# Patient Record
Sex: MALE | Race: WHITE | NOT HISPANIC OR LATINO | Employment: FULL TIME | URBAN - METROPOLITAN AREA
[De-identification: names, ages, dates, MRNs, and addresses within clinical notes are randomized per-mention and may not be internally consistent; named-entity substitution may affect disease eponyms.]

---

## 2018-06-08 ENCOUNTER — TELEPHONE (OUTPATIENT)
Dept: FAMILY MEDICINE | Facility: CLINIC | Age: 53
End: 2018-06-08

## 2018-06-08 NOTE — TELEPHONE ENCOUNTER
----- Message from Giovany Del Real sent at 6/8/2018  4:47 PM CDT -----  Doctor appointment and lab have been scheduled.  Please link lab orders to the lab appointment.  Date of doctor appointment:  7/30  Physical or EP:  Physical  Date of lab appointment:  7/23  Comments:

## 2018-09-28 ENCOUNTER — OFFICE VISIT (OUTPATIENT)
Dept: FAMILY MEDICINE | Facility: CLINIC | Age: 53
End: 2018-09-28
Payer: COMMERCIAL

## 2018-09-28 VITALS
TEMPERATURE: 98 F | BODY MASS INDEX: 29.32 KG/M2 | DIASTOLIC BLOOD PRESSURE: 78 MMHG | HEIGHT: 70 IN | WEIGHT: 204.81 LBS | RESPIRATION RATE: 20 BRPM | SYSTOLIC BLOOD PRESSURE: 100 MMHG

## 2018-09-28 DIAGNOSIS — R01.1 HEART MURMUR: ICD-10-CM

## 2018-09-28 DIAGNOSIS — Z12.11 SCREENING FOR COLORECTAL CANCER: ICD-10-CM

## 2018-09-28 DIAGNOSIS — Z86.006 HISTORY OF MELANOMA IN SITU: ICD-10-CM

## 2018-09-28 DIAGNOSIS — Z23 NEED FOR PROPHYLACTIC VACCINATION AND INOCULATION AGAINST INFLUENZA: ICD-10-CM

## 2018-09-28 DIAGNOSIS — Z22.7 LATENT TUBERCULOSIS BY SKIN TEST: ICD-10-CM

## 2018-09-28 DIAGNOSIS — R74.01 ELEVATED TRANSAMINASE LEVEL: ICD-10-CM

## 2018-09-28 DIAGNOSIS — Z00.00 ANNUAL PHYSICAL EXAM: Primary | ICD-10-CM

## 2018-09-28 DIAGNOSIS — Z12.12 SCREENING FOR COLORECTAL CANCER: ICD-10-CM

## 2018-09-28 PROCEDURE — 90686 IIV4 VACC NO PRSV 0.5 ML IM: CPT | Mod: S$GLB,,, | Performed by: INTERNAL MEDICINE

## 2018-09-28 PROCEDURE — 99999 PR PBB SHADOW E&M-EST. PATIENT-LVL III: CPT | Mod: PBBFAC,,, | Performed by: INTERNAL MEDICINE

## 2018-09-28 PROCEDURE — 99386 PREV VISIT NEW AGE 40-64: CPT | Mod: 25,S$GLB,, | Performed by: INTERNAL MEDICINE

## 2018-09-28 PROCEDURE — 90471 IMMUNIZATION ADMIN: CPT | Mod: S$GLB,,, | Performed by: INTERNAL MEDICINE

## 2018-09-28 NOTE — PROGRESS NOTES
Two patient identifiers verified.  Allergies reviewed.Flu vaccine fiven   IM administered to Left deltoid per MD order.  Patient tolerated injection well; no redness, bleeding, or bruising noted to injection site.  Patient instructed to remain in clinic setting for 15 minutes.  Verbalizes understanding.  Flu consent signed by patient. Flu vaccine administered.

## 2018-09-28 NOTE — PROGRESS NOTES
Subjective:        Patient ID: Hector Torres is a 53 y.o. male.    Chief Complaint: Annual Exam    HPI   Hector Torres presents for annual exam and to establish care.    Review of Systems   Constitutional: Negative for activity change and unexpected weight change.   HENT: Negative for ear pain, hearing loss, sore throat and trouble swallowing.    Eyes: Negative for visual disturbance (glasses UTD).   Respiratory: Negative for chest tightness and shortness of breath.    Cardiovascular: Negative for chest pain and leg swelling.        Has been told in the past he has heart murmur   Gastrointestinal: Negative for abdominal pain, blood in stool, constipation and diarrhea.   Genitourinary: Positive for frequency (more than normal in the past). Negative for difficulty urinating, dysuria and hematuria.   Musculoskeletal: Negative for arthralgias and back pain.   Skin: Negative for rash.   Allergic/Immunologic: Negative for immunocompromised state.   Neurological: Negative for dizziness, light-headedness and headaches.   Hematological: Does not bruise/bleed easily.   Psychiatric/Behavioral: Negative for dysphoric mood and sleep disturbance. The patient is not nervous/anxious.            Objective:        Vitals:    09/28/18 1308   BP: 100/78   Resp: 20   Temp: 98.4 °F (36.9 °C)     Physical Exam   Constitutional: He is oriented to person, place, and time. He appears well-developed and well-nourished. No distress.   HENT:   Head: Normocephalic and atraumatic.   Right Ear: External ear normal.   Left Ear: External ear normal.   Nose: Nose normal.   - bilateral ear canals clear, tympanic membranes visualized - normal color and light reflex   Eyes: Conjunctivae and EOM are normal.   Neck: Normal range of motion. Neck supple. No thyromegaly present.   Cardiovascular: Normal rate and regular rhythm.   Murmur (2/6) heard.  Pulmonary/Chest: Effort normal and breath sounds normal. No respiratory distress.   Abdominal: Soft.  He exhibits no distension and no mass. There is no tenderness. There is no guarding.   Musculoskeletal: He exhibits no edema.   Lymphadenopathy:     He has no cervical adenopathy.   Neurological: He is alert and oriented to person, place, and time.   Skin: Skin is warm and dry. No rash noted. No erythema.   Psychiatric: He has a normal mood and affect. His behavior is normal. Judgment and thought content normal.   Vitals reviewed.      Pt had labs done 1 month ago for life insurance.  Normal lipids (HDL >50), BUN/cr, PSA, HIV neg, glucose.  AST 50s, nl ALT.    Assessment:         1. Annual physical exam    2. Need for prophylactic vaccination and inoculation against influenza    3. Screening for colorectal cancer    4. History of melanoma in situ    5. Latent tuberculosis by skin test    6. Heart murmur    7. Elevated transaminase level              Plan:         Hector was seen today for annual exam.    Diagnoses and all orders for this visit:    Annual physical exam  - flu shot today  - c-scope ordered; pt had first c-scope 3 years ago, polyps removed and repeat recommended in 3 years  - reviewed prostate cancer screening guidelines    Need for prophylactic vaccination and inoculation against influenza  -     Flu Vaccine - Quadrivalent (PF) (3 years & older)    Screening for colorectal cancer  -     Case request GI: COLONOSCOPY    History of melanoma in situ: Sees derm annually for skin exam.    Latent tuberculosis by skin test: Treated.    Heart murmur: Asymptomatic.  If worse or becomes symptomatic, will get echo.    Elevated transaminase level: Repeat liver enzymes.  -     Hepatic function panel; Future            Follow-up in about 1 year (around 9/28/2019) for annual exam or sooner as needed.

## 2018-10-04 ENCOUNTER — PATIENT MESSAGE (OUTPATIENT)
Dept: FAMILY MEDICINE | Facility: CLINIC | Age: 53
End: 2018-10-04

## 2018-10-04 ENCOUNTER — LAB VISIT (OUTPATIENT)
Dept: LAB | Facility: HOSPITAL | Age: 53
End: 2018-10-04
Attending: INTERNAL MEDICINE
Payer: COMMERCIAL

## 2018-10-04 DIAGNOSIS — R74.01 ELEVATED TRANSAMINASE LEVEL: ICD-10-CM

## 2018-10-04 DIAGNOSIS — Z12.11 SPECIAL SCREENING FOR MALIGNANT NEOPLASMS, COLON: Primary | ICD-10-CM

## 2018-10-04 LAB
ALBUMIN SERPL BCP-MCNC: 4.2 G/DL
ALP SERPL-CCNC: 41 U/L
ALT SERPL W/O P-5'-P-CCNC: 32 U/L
AST SERPL-CCNC: 46 U/L
BILIRUB DIRECT SERPL-MCNC: 0.3 MG/DL
BILIRUB SERPL-MCNC: 0.8 MG/DL
PROT SERPL-MCNC: 7 G/DL

## 2018-10-04 PROCEDURE — 36415 COLL VENOUS BLD VENIPUNCTURE: CPT | Mod: PO

## 2018-10-04 PROCEDURE — 80076 HEPATIC FUNCTION PANEL: CPT

## 2018-10-04 RX ORDER — POLYETHYLENE GLYCOL 3350, SODIUM SULFATE ANHYDROUS, SODIUM BICARBONATE, SODIUM CHLORIDE, POTASSIUM CHLORIDE 236; 22.74; 6.74; 5.86; 2.97 G/4L; G/4L; G/4L; G/4L; G/4L
4 POWDER, FOR SOLUTION ORAL ONCE
Qty: 4000 ML | Refills: 0 | Status: SHIPPED | OUTPATIENT
Start: 2018-10-04 | End: 2018-10-04

## 2018-11-02 ENCOUNTER — HOSPITAL ENCOUNTER (OUTPATIENT)
Facility: HOSPITAL | Age: 53
Discharge: HOME | End: 2018-11-02
Attending: COLON & RECTAL SURGERY | Admitting: COLON & RECTAL SURGERY
Payer: COMMERCIAL

## 2018-11-02 ENCOUNTER — ANESTHESIA EVENT (OUTPATIENT)
Dept: ENDOSCOPY | Facility: HOSPITAL | Age: 53
End: 2018-11-02
Payer: COMMERCIAL

## 2018-11-02 ENCOUNTER — ANESTHESIA (OUTPATIENT)
Dept: ENDOSCOPY | Facility: HOSPITAL | Age: 53
End: 2018-11-02
Payer: COMMERCIAL

## 2018-11-02 VITALS
OXYGEN SATURATION: 100 % | DIASTOLIC BLOOD PRESSURE: 79 MMHG | TEMPERATURE: 97 F | BODY MASS INDEX: 28.35 KG/M2 | HEART RATE: 57 BPM | WEIGHT: 198 LBS | SYSTOLIC BLOOD PRESSURE: 116 MMHG | HEIGHT: 70 IN | RESPIRATION RATE: 16 BRPM

## 2018-11-02 DIAGNOSIS — Z86.010 HISTORY OF COLON POLYPS: Primary | ICD-10-CM

## 2018-11-02 DIAGNOSIS — Z12.11 SCREENING FOR COLON CANCER: ICD-10-CM

## 2018-11-02 PROCEDURE — G0105 COLORECTAL SCRN; HI RISK IND: HCPCS | Mod: ,,, | Performed by: COLON & RECTAL SURGERY

## 2018-11-02 PROCEDURE — 37000009 HC ANESTHESIA EA ADD 15 MINS: Performed by: COLON & RECTAL SURGERY

## 2018-11-02 PROCEDURE — 63600175 PHARM REV CODE 636 W HCPCS: Performed by: NURSE ANESTHETIST, CERTIFIED REGISTERED

## 2018-11-02 PROCEDURE — 37000008 HC ANESTHESIA 1ST 15 MINUTES: Performed by: COLON & RECTAL SURGERY

## 2018-11-02 PROCEDURE — E9220 PRA ENDO ANESTHESIA: HCPCS | Mod: ,,, | Performed by: NURSE ANESTHETIST, CERTIFIED REGISTERED

## 2018-11-02 PROCEDURE — 25000003 PHARM REV CODE 250: Performed by: NURSE PRACTITIONER

## 2018-11-02 PROCEDURE — G0105 COLORECTAL SCRN; HI RISK IND: HCPCS | Performed by: COLON & RECTAL SURGERY

## 2018-11-02 RX ORDER — SODIUM CHLORIDE 0.9 % (FLUSH) 0.9 %
3 SYRINGE (ML) INJECTION
Status: DISCONTINUED | OUTPATIENT
Start: 2018-11-02 | End: 2021-10-12

## 2018-11-02 RX ORDER — LIDOCAINE HCL/PF 100 MG/5ML
SYRINGE (ML) INTRAVENOUS
Status: DISCONTINUED | OUTPATIENT
Start: 2018-11-02 | End: 2018-11-02

## 2018-11-02 RX ORDER — PROPOFOL 10 MG/ML
VIAL (ML) INTRAVENOUS CONTINUOUS PRN
Status: DISCONTINUED | OUTPATIENT
Start: 2018-11-02 | End: 2018-11-02

## 2018-11-02 RX ORDER — PROPOFOL 10 MG/ML
VIAL (ML) INTRAVENOUS
Status: DISCONTINUED | OUTPATIENT
Start: 2018-11-02 | End: 2018-11-02

## 2018-11-02 RX ORDER — SODIUM CHLORIDE 9 MG/ML
INJECTION, SOLUTION INTRAVENOUS CONTINUOUS
Status: DISCONTINUED | OUTPATIENT
Start: 2018-11-02 | End: 2021-10-12

## 2018-11-02 RX ADMIN — SODIUM CHLORIDE: 0.9 INJECTION, SOLUTION INTRAVENOUS at 08:11

## 2018-11-02 RX ADMIN — PROPOFOL 200 MCG/KG/MIN: 10 INJECTION, EMULSION INTRAVENOUS at 09:11

## 2018-11-02 RX ADMIN — LIDOCAINE HYDROCHLORIDE 40 MG: 20 INJECTION, SOLUTION INTRAVENOUS at 09:11

## 2018-11-02 RX ADMIN — PROPOFOL 90 MG: 10 INJECTION, EMULSION INTRAVENOUS at 09:11

## 2018-11-02 NOTE — [employer] PATIENT INSTRUCTIONS
Discharge Summary/Instructions after an Endoscopic Procedure  Patient Name: Hector Torres  Patient MRN: [id number]  Patient YOB: 1965 Friday, November 02, 2018  Liu Carter MD  RESTRICTIONS:  During your procedure today, you received medications for sedation.  These   medications may affect your judgment, balance and coordination.  Therefore,   for 24 hours, you have the following restrictions:   - DO NOT drive a car, operate machinery, make legal/financial decisions,   sign important papers or drink alcohol.    ACTIVITY:  Today: no heavy lifting, straining or running due to procedural   sedation/anesthesia.  The following day: return to full activity including work.  DIET:  Eat and drink normally unless instructed otherwise.     TREATMENT FOR COMMON SIDE EFFECTS:  - Mild abdominal pain, nausea, belching, bloating or excessive gas:  rest,   eat lightly and use a heating pad.  - Sore Throat: treat with throat lozenges and/or gargle with warm salt   water.  - Because air was used during the procedure, expelling large amounts of air   from your rectum or belching is normal.  - If a bowel prep was taken, you may not have a bowel movement for 1-3 days.    This is normal.  SYMPTOMS TO WATCH FOR AND REPORT TO YOUR PHYSICIAN:  1. Abdominal pain or bloating, other than gas cramps.  2. Chest pain.  3. Back pain.  4. Signs of infection such as: chills or fever occurring within 24 hours   after the procedure.  5. Rectal bleeding, which would show as bright red, maroon, or black stools.   (A tablespoon of blood from the rectum is not serious, especially if   hemorrhoids are present.)  6. Vomiting.  7. Weakness or dizziness.  GO DIRECTLY TO THE NEAREST EMERGENCY ROOM IF YOU HAVE ANY OF THE FOLLOWING:      Difficulty breathing              Chills and/or fever over 101 F   Persistent vomiting and/or vomiting blood   Severe abdominal pain   Severe chest pain   Black, tarry stools   Bleeding- more than one  tablespoon   Any other symptom or condition that you feel may need urgent attention  Your doctor recommends these additional instructions:  If any biopsies were taken, your doctors clinic will contact you in 1 to 2   weeks with any results.  - Discharge patient to home (ambulatory).   - Repeat colonoscopy in 5 years for surveillance.  For questions, problems or results please call your physician - Liu Carter MD at Work:  [phone].  [facility], EMERGENCY ROOM PHONE NUMBER: [phone]  IF A COMPLICATION OR EMERGENCY SITUATION ARISES AND YOU ARE UNABLE TO REACH   YOUR PHYSICIAN - GO DIRECTLY TO THE EMERGENCY ROOM.  Liu Carter MD  11/2/2018 9:32:46 AM  This report has been verified and signed electronically.  [employer]

## 2018-11-02 NOTE — ANESTHESIA PREPROCEDURE EVALUATION
11/02/2018  Hector Torres is a 53 y.o., male.  Past Medical History:   Diagnosis Date    Heart murmur 9/28/2018 9/28/18 2/6    History of melanoma in situ 9/28/2018    Lower back, sees derm for annual skin exam    Latent tuberculosis by skin test 9/28/2018    Treated     Past Surgical History:   Procedure Laterality Date    EYE SURGERY  1967    SKIN CANCER EXCISION      melanoma in situ on lower back         Anesthesia Evaluation    I have reviewed the Patient Summary Reports.     I have reviewed the Medications.     Review of Systems  Hematology/Oncology:  Hematology Normal   Oncology Normal     EENT/Dental:EENT/Dental Normal   Cardiovascular:  Cardiovascular Normal   Functional Capacity good / => 4 METS  Other Cardiac Conditions Hx of benign murmur, no treatment required. Excellent functional capacity.  Pulmonary:  Pulmonary Normal    Renal/:  Renal/ Normal     Hepatic/GI:  Hepatic/GI Normal    Musculoskeletal:  Musculoskeletal Normal    Neurological:  Neurology Normal    Endocrine:  Endocrine Normal    Dermatological:  Skin Normal    Psych:  Psychiatric Normal           Physical Exam  General:  Well nourished    Airway/Jaw/Neck:  Airway Findings: Mouth Opening: Normal Tongue: Normal  General Airway Assessment: Adult, Good  Mallampati: II  Improves to II with phonation.  TM Distance: Normal, at least 6 cm        Eyes/Ears/Nose:  EYES/EARS/NOSE FINDINGS: Normal   Dental:  DENTAL FINDINGS: Normal   Chest/Lungs:  Chest/Lungs Clear    Heart/Vascular:  Heart Findings: Normal Heart murmur: negative Vascular Findings: Normal    Abdomen:  Abdomen Findings: Normal    Musculoskeletal:  Musculoskeletal Findings: Normal   Skin:  Skin Findings: Normal    Mental Status:  Mental Status Findings: Normal        Anesthesia Plan  Type of Anesthesia, risks & benefits discussed:  Anesthesia Type:   general  Patient's Preference: general  Intra-op Monitoring Plan: standard ASA monitors  Intra-op Monitoring Plan Comments:   Post Op Pain Control Plan:   Post Op Pain Control Plan Comments:   Induction:   IV  Beta Blocker:  Patient is not currently on a Beta-Blocker (No further documentation required).       Informed Consent: Patient understands risks and agrees with Anesthesia plan.  Questions answered. Anesthesia consent signed with patient.  ASA Score: 2     Day of Surgery Review of History & Physical:    H&P update referred to the provider.         Ready For Surgery From Anesthesia Perspective.

## 2018-11-02 NOTE — TRANSFER OF CARE
"Anesthesia Transfer of Care Note    Patient: Hector Torres    Procedure(s) Performed: Procedure(s) (LRB):  COLONOSCOPY (N/A)    Patient location: GI    Anesthesia Type: general    Transport from OR: Transported from OR on room air with adequate spontaneous ventilation    Post pain: adequate analgesia    Post assessment: no apparent anesthetic complications and tolerated procedure well    Post vital signs: stable    Level of consciousness: awake and responds to stimulation    Nausea/Vomiting: no nausea/vomiting    Complications: none    Transfer of care protocol was followed      Last vitals:   Visit Vitals  /73 (BP Location: Left arm, Patient Position: Sitting)   Pulse (!) 54   Temp 36.6 °C (97.9 °F)   Resp 16   Ht 5' 10" (1.778 m)   Wt 89.8 kg (198 lb)   SpO2 100%   BMI 28.41 kg/m²     "

## 2018-11-02 NOTE — ANESTHESIA POSTPROCEDURE EVALUATION
"Anesthesia Post Evaluation    Patient: Hector Torres    Procedure(s) Performed: Procedure(s) (LRB):  COLONOSCOPY (N/A)    Final Anesthesia Type: general  Patient location during evaluation: PACU  Patient participation: Yes- Able to Participate  Level of consciousness: awake and alert and oriented  Post-procedure vital signs: reviewed and stable  Pain management: adequate  Airway patency: patent  PONV status at discharge: No PONV  Anesthetic complications: no      Cardiovascular status: blood pressure returned to baseline  Respiratory status: unassisted  Hydration status: euvolemic  Follow-up not needed.        Visit Vitals  /79   Pulse (!) 57   Temp 36.3 °C (97.3 °F)   Resp 16   Ht 5' 10" (1.778 m)   Wt 89.8 kg (198 lb)   SpO2 100%   BMI 28.41 kg/m²       Pain/Ignacio Score: Pain Assessment Performed: Yes (11/2/2018 10:05 AM)  Presence of Pain: denies (11/2/2018 10:05 AM)  Ignacio Score: 10 (11/2/2018 10:05 AM)        "

## 2018-11-02 NOTE — H&P
Endoscopy H&P    Procedure : Colonoscopy      personal history of colon polyps      Past Medical History:   Diagnosis Date    Heart murmur 9/28/2018 9/28/18 2/6    History of melanoma in situ 9/28/2018    Lower back, sees derm for annual skin exam    Latent tuberculosis by skin test 9/28/2018    Treated             Review of Systems -ROS:  GENERAL: No fever, chills, fatigability or weight loss.  CHEST: Denies GUADARRAMA, cyanosis, wheezing, cough and sputum production.  CARDIOVASCULAR: Denies chest pain, PND, orthopnea or reduced exercise tolerance.   Musculoskeletal ROS: negative for - gait disturbance or joint pain  Neurological ROS: negative for - confusion or memory loss        Physical Exam:  General: well developed, well nourished, no distress  Head: normocephalic  Neck: supple, symmetrical, trachea midline  Lungs:  clear to auscultation bilaterally and normal respiratory effort  Heart: regular rate and rhythm, S1, S2 normal, no murmur, rub or gallop and regular rate and rhythm  Abdomen: soft, non-tender non-distented; bowel sounds normal; no masses,  no organomegaly  Extremities: no cyanosis or edema, or clubbing       Moderate Sedation (choice): Mallampati Score 1    ASA : II    IMP: personal history of colon polyps    Plan: Colonoscopy with Moderate sedation.  I have explained the procedure including indications, alternatives, expected outcomes and potential complications. The patient appears to understand and gives informed consent. The patient is medically ready for surgery.

## 2018-11-09 ENCOUNTER — TELEPHONE (OUTPATIENT)
Dept: ENDOSCOPY | Facility: HOSPITAL | Age: 53
End: 2018-11-09

## 2019-09-16 ENCOUNTER — PATIENT OUTREACH (OUTPATIENT)
Dept: ADMINISTRATIVE | Facility: HOSPITAL | Age: 54
End: 2019-09-16

## 2019-09-16 NOTE — PROGRESS NOTES
Pre-visit chart review completed.   Hepatitis C Screening     Shingles Vaccine (1 of 2)    PROSTATE-SPECIFIC ANTIGEN     Influenza Vaccine (1)

## 2019-09-30 ENCOUNTER — LAB VISIT (OUTPATIENT)
Dept: LAB | Facility: HOSPITAL | Age: 54
End: 2019-09-30
Attending: INTERNAL MEDICINE
Payer: COMMERCIAL

## 2019-09-30 ENCOUNTER — IMMUNIZATION (OUTPATIENT)
Dept: PHARMACY | Facility: CLINIC | Age: 54
End: 2019-09-30
Payer: COMMERCIAL

## 2019-09-30 ENCOUNTER — OFFICE VISIT (OUTPATIENT)
Dept: PRIMARY CARE CLINIC | Facility: CLINIC | Age: 54
End: 2019-09-30
Payer: COMMERCIAL

## 2019-09-30 ENCOUNTER — PATIENT MESSAGE (OUTPATIENT)
Dept: PRIMARY CARE CLINIC | Facility: CLINIC | Age: 54
End: 2019-09-30

## 2019-09-30 VITALS
BODY MASS INDEX: 29.13 KG/M2 | HEIGHT: 70 IN | DIASTOLIC BLOOD PRESSURE: 88 MMHG | SYSTOLIC BLOOD PRESSURE: 123 MMHG | HEART RATE: 67 BPM | TEMPERATURE: 98 F | WEIGHT: 203.5 LBS

## 2019-09-30 DIAGNOSIS — Z23 INFLUENZA VACCINE NEEDED: ICD-10-CM

## 2019-09-30 DIAGNOSIS — Z11.59 NEED FOR HEPATITIS C SCREENING TEST: ICD-10-CM

## 2019-09-30 DIAGNOSIS — Z00.00 ROUTINE MEDICAL EXAM: ICD-10-CM

## 2019-09-30 DIAGNOSIS — Z00.00 ROUTINE MEDICAL EXAM: Primary | ICD-10-CM

## 2019-09-30 DIAGNOSIS — Z12.5 PROSTATE CANCER SCREENING: ICD-10-CM

## 2019-09-30 PROBLEM — Z12.11 SCREENING FOR COLON CANCER: Status: RESOLVED | Noted: 2018-11-02 | Resolved: 2019-09-30

## 2019-09-30 LAB
ALBUMIN SERPL BCP-MCNC: 4.7 G/DL (ref 3.5–5.2)
ALP SERPL-CCNC: 41 U/L (ref 55–135)
ALT SERPL W/O P-5'-P-CCNC: 34 U/L (ref 10–44)
AMORPH CRY UR QL COMP ASSIST: NORMAL
ANION GAP SERPL CALC-SCNC: 10 MMOL/L (ref 8–16)
AST SERPL-CCNC: 31 U/L (ref 10–40)
BASOPHILS # BLD AUTO: 0.02 K/UL (ref 0–0.2)
BASOPHILS NFR BLD: 0.3 % (ref 0–1.9)
BILIRUB SERPL-MCNC: 0.6 MG/DL (ref 0.1–1)
BILIRUB UR QL STRIP: NEGATIVE
BUN SERPL-MCNC: 18 MG/DL (ref 6–20)
CALCIUM SERPL-MCNC: 9.2 MG/DL (ref 8.7–10.5)
CHLORIDE SERPL-SCNC: 104 MMOL/L (ref 95–110)
CHOLEST SERPL-MCNC: 214 MG/DL (ref 120–199)
CHOLEST/HDLC SERPL: 4.6 {RATIO} (ref 2–5)
CLARITY UR REFRACT.AUTO: ABNORMAL
CO2 SERPL-SCNC: 28 MMOL/L (ref 23–29)
COLOR UR AUTO: YELLOW
COMPLEXED PSA SERPL-MCNC: 0.38 NG/ML (ref 0–4)
CREAT SERPL-MCNC: 1.1 MG/DL (ref 0.5–1.4)
DIFFERENTIAL METHOD: ABNORMAL
EOSINOPHIL # BLD AUTO: 0 K/UL (ref 0–0.5)
EOSINOPHIL NFR BLD: 0.5 % (ref 0–8)
ERYTHROCYTE [DISTWIDTH] IN BLOOD BY AUTOMATED COUNT: 12.4 % (ref 11.5–14.5)
EST. GFR  (AFRICAN AMERICAN): >60 ML/MIN/1.73 M^2
EST. GFR  (NON AFRICAN AMERICAN): >60 ML/MIN/1.73 M^2
GLUCOSE SERPL-MCNC: 99 MG/DL (ref 70–110)
GLUCOSE UR QL STRIP: NEGATIVE
HCT VFR BLD AUTO: 43.8 % (ref 40–54)
HDLC SERPL-MCNC: 47 MG/DL (ref 40–75)
HDLC SERPL: 22 % (ref 20–50)
HGB BLD-MCNC: 13.9 G/DL (ref 14–18)
HGB UR QL STRIP: ABNORMAL
IMM GRANULOCYTES # BLD AUTO: 0.01 K/UL (ref 0–0.04)
IMM GRANULOCYTES NFR BLD AUTO: 0.2 % (ref 0–0.5)
KETONES UR QL STRIP: ABNORMAL
LDLC SERPL CALC-MCNC: 157.2 MG/DL (ref 63–159)
LEUKOCYTE ESTERASE UR QL STRIP: NEGATIVE
LYMPHOCYTES # BLD AUTO: 1.8 K/UL (ref 1–4.8)
LYMPHOCYTES NFR BLD: 30.4 % (ref 18–48)
MCH RBC QN AUTO: 29.7 PG (ref 27–31)
MCHC RBC AUTO-ENTMCNC: 31.7 G/DL (ref 32–36)
MCV RBC AUTO: 94 FL (ref 82–98)
MICROSCOPIC COMMENT: NORMAL
MONOCYTES # BLD AUTO: 0.5 K/UL (ref 0.3–1)
MONOCYTES NFR BLD: 8.9 % (ref 4–15)
NEUTROPHILS # BLD AUTO: 3.5 K/UL (ref 1.8–7.7)
NEUTROPHILS NFR BLD: 59.7 % (ref 38–73)
NITRITE UR QL STRIP: NEGATIVE
NONHDLC SERPL-MCNC: 167 MG/DL
NRBC BLD-RTO: 0 /100 WBC
PH UR STRIP: 5 [PH] (ref 5–8)
PLATELET # BLD AUTO: 252 K/UL (ref 150–350)
PMV BLD AUTO: 11.3 FL (ref 9.2–12.9)
POTASSIUM SERPL-SCNC: 4.3 MMOL/L (ref 3.5–5.1)
PROT SERPL-MCNC: 7.4 G/DL (ref 6–8.4)
PROT UR QL STRIP: NEGATIVE
RBC # BLD AUTO: 4.68 M/UL (ref 4.6–6.2)
RBC #/AREA URNS AUTO: 4 /HPF (ref 0–4)
SODIUM SERPL-SCNC: 142 MMOL/L (ref 136–145)
SP GR UR STRIP: 1.02 (ref 1–1.03)
TRIGL SERPL-MCNC: 49 MG/DL (ref 30–150)
URN SPEC COLLECT METH UR: ABNORMAL
WBC # BLD AUTO: 5.93 K/UL (ref 3.9–12.7)

## 2019-09-30 PROCEDURE — 99999 PR PBB SHADOW E&M-EST. PATIENT-LVL III: ICD-10-PCS | Mod: PBBFAC,,, | Performed by: INTERNAL MEDICINE

## 2019-09-30 PROCEDURE — 99396 PREV VISIT EST AGE 40-64: CPT | Mod: S$GLB,,, | Performed by: INTERNAL MEDICINE

## 2019-09-30 PROCEDURE — 85025 COMPLETE CBC W/AUTO DIFF WBC: CPT

## 2019-09-30 PROCEDURE — 81001 URINALYSIS AUTO W/SCOPE: CPT

## 2019-09-30 PROCEDURE — 80053 COMPREHEN METABOLIC PANEL: CPT

## 2019-09-30 PROCEDURE — 80061 LIPID PANEL: CPT

## 2019-09-30 PROCEDURE — 99396 PR PREVENTIVE VISIT,EST,40-64: ICD-10-PCS | Mod: S$GLB,,, | Performed by: INTERNAL MEDICINE

## 2019-09-30 PROCEDURE — 84153 ASSAY OF PSA TOTAL: CPT

## 2019-09-30 PROCEDURE — 86803 HEPATITIS C AB TEST: CPT

## 2019-09-30 PROCEDURE — 99999 PR PBB SHADOW E&M-EST. PATIENT-LVL III: CPT | Mod: PBBFAC,,, | Performed by: INTERNAL MEDICINE

## 2019-09-30 PROCEDURE — 36415 COLL VENOUS BLD VENIPUNCTURE: CPT | Mod: PN

## 2019-09-30 NOTE — PROGRESS NOTES
Subjective:       Patient ID: Hector Torres is a 54 y.o. male.    Chief Complaint: Annual Exam    Last seen by previous PCP one year ago. Returns for annual exam and transfer of care since that doctor has relocated. No acute complaints, generally feels well. Currently on Ketogenic diet, exercising.     PMH:   Melanoma in situ, Derm in [place] 9/19.   Latent TB treated.   H/O Heart murmur.     PSH: Skin cancer excision, Eye surgery at age 2.     Colonoscopy 2015 - polyps removed, repeat 11/18 entirely normal. Labs outside [facility] 2018 normal including PSA, Lipids and HbA1c. Eye exam 2/18. Flu shot 9/18. Td <10 yrs.     Social: Non-smoker, social alcohol. , 3 children. [employer]  of [employer]     FMH: HTN both parents, CAD father at an elderly age, Arthritis in mother. Colon cancer in a grandparent. DM in a niece.     NKDA.    Medications: None except a supplement that's part of his ketogenic diet plan.     Review of Systems   Constitutional: Negative for activity change, appetite change, chills, fatigue, fever and unexpected weight change.   HENT: Negative for congestion, ear pain, hearing loss, postnasal drip, rhinorrhea, sore throat, trouble swallowing and voice change.    Eyes: Negative for pain and visual disturbance.   Respiratory: Negative for apnea, cough, chest tightness, shortness of breath and wheezing.    Cardiovascular: Negative for chest pain, palpitations and leg swelling.   Gastrointestinal: Negative for abdominal pain, blood in stool, constipation, diarrhea, nausea and vomiting.   Genitourinary: Positive for urgency. Negative for difficulty urinating, dysuria, frequency, hematuria, penile pain, scrotal swelling and testicular pain.        Mild intermittent urinary urgency and pre-void leakage.    Musculoskeletal: Negative for arthralgias, back pain, gait problem, myalgias, neck pain and neck stiffness.   Skin: Negative for color change and rash.   Neurological: Negative  "for dizziness, seizures, syncope, weakness, numbness and headaches.   Hematological: Negative for adenopathy. Does not bruise/bleed easily.   Psychiatric/Behavioral: Negative for agitation, dysphoric mood and sleep disturbance. The patient is not nervous/anxious.        Objective:    /88, Pulse 67, Temp 98.1, Ht 5' 10", Wt 203.4 lbs (stable), BMI=29.2  Physical Exam   Constitutional: He is oriented to person, place, and time. He appears well-developed and well-nourished. No distress.   HENT:   Head: Normocephalic and atraumatic.   Right Ear: External ear normal.   Left Ear: External ear normal.   Nose: Nose normal.   Mouth/Throat: Oropharynx is clear and moist.   Eyes: Pupils are equal, round, and reactive to light. Conjunctivae and EOM are normal. No scleral icterus.   Neck: Normal range of motion. Neck supple. No JVD present. Carotid bruit is not present. No thyromegaly present.   Cardiovascular: Normal rate, regular rhythm, normal heart sounds and intact distal pulses. Exam reveals no gallop and no friction rub.   No murmur heard.  Pulmonary/Chest: Effort normal and breath sounds normal. No respiratory distress. He has no wheezes. He has no rales.   Abdominal: Soft. Bowel sounds are normal. He exhibits no distension and no mass. There is no tenderness.   Genitourinary: Rectum normal, prostate normal and penis normal. Rectal exam shows guaiac negative stool.   Genitourinary Comments: No inguinal hernia, adenopathy, scrotal swelling or testicular mass.    Musculoskeletal: Normal range of motion. He exhibits no edema, tenderness or deformity.   Lymphadenopathy:     He has no cervical adenopathy.   Neurological: He is alert and oriented to person, place, and time. He has normal reflexes. No cranial nerve deficit. He exhibits normal muscle tone. Coordination normal.   Skin: Skin is warm and dry. No rash noted. He is not diaphoretic.   Psychiatric: He has a normal mood and affect. His behavior is normal. Judgment " and thought content normal.       Assessment:       1. Routine medical exam    2. Prostate cancer screening    3. Need for hepatitis C screening test    4. Influenza vaccine needed        Plan:       Routine medical exam  -     CBC auto differential; Future; Expected date: 09/30/2019  -     Comprehensive metabolic panel; Future; Expected date: 09/30/2019  -     Lipid panel; Future; Expected date: 09/30/2019  -     Urinalysis    Prostate cancer screening  -     PSA, Screening; Future; Expected date: 09/30/2019    Need for hepatitis C screening test  -     Hepatitis C antibody; Future; Expected date: 09/30/2019    Influenza vaccine needed - Flu shot today.     Shingrix declined.

## 2019-10-01 ENCOUNTER — PATIENT MESSAGE (OUTPATIENT)
Dept: PRIMARY CARE CLINIC | Facility: CLINIC | Age: 54
End: 2019-10-01

## 2019-10-01 LAB — HCV AB SERPL QL IA: NEGATIVE

## 2020-10-01 ENCOUNTER — LAB VISIT (OUTPATIENT)
Dept: LAB | Facility: HOSPITAL | Age: 55
End: 2020-10-01
Attending: FAMILY MEDICINE
Payer: COMMERCIAL

## 2020-10-01 ENCOUNTER — IMMUNIZATION (OUTPATIENT)
Dept: PHARMACY | Facility: CLINIC | Age: 55
End: 2020-10-01
Payer: COMMERCIAL

## 2020-10-01 ENCOUNTER — OFFICE VISIT (OUTPATIENT)
Dept: PRIMARY CARE CLINIC | Facility: CLINIC | Age: 55
End: 2020-10-01
Payer: COMMERCIAL

## 2020-10-01 VITALS
HEIGHT: 69 IN | DIASTOLIC BLOOD PRESSURE: 82 MMHG | HEART RATE: 78 BPM | BODY MASS INDEX: 29.19 KG/M2 | SYSTOLIC BLOOD PRESSURE: 114 MMHG | TEMPERATURE: 98 F | OXYGEN SATURATION: 96 % | WEIGHT: 197.06 LBS

## 2020-10-01 DIAGNOSIS — Z00.00 LABORATORY EXAM ORDERED AS PART OF ROUTINE GENERAL MEDICAL EXAMINATION: ICD-10-CM

## 2020-10-01 DIAGNOSIS — Z00.00 ANNUAL PHYSICAL EXAM: Primary | ICD-10-CM

## 2020-10-01 DIAGNOSIS — Z53.20 HIV SCREENING DECLINED: ICD-10-CM

## 2020-10-01 DIAGNOSIS — Z86.006 HISTORY OF MELANOMA IN SITU: ICD-10-CM

## 2020-10-01 LAB
ALBUMIN SERPL BCP-MCNC: 4.8 G/DL (ref 3.5–5.2)
ALP SERPL-CCNC: 37 U/L (ref 55–135)
ALT SERPL W/O P-5'-P-CCNC: 25 U/L (ref 10–44)
ANION GAP SERPL CALC-SCNC: 13 MMOL/L (ref 8–16)
AST SERPL-CCNC: 24 U/L (ref 10–40)
BASOPHILS # BLD AUTO: 0.02 K/UL (ref 0–0.2)
BASOPHILS NFR BLD: 0.3 % (ref 0–1.9)
BILIRUB SERPL-MCNC: 1.4 MG/DL (ref 0.1–1)
BUN SERPL-MCNC: 25 MG/DL (ref 6–20)
CALCIUM SERPL-MCNC: 9.3 MG/DL (ref 8.7–10.5)
CHLORIDE SERPL-SCNC: 103 MMOL/L (ref 95–110)
CHOLEST SERPL-MCNC: 200 MG/DL (ref 120–199)
CHOLEST/HDLC SERPL: 3.7 {RATIO} (ref 2–5)
CO2 SERPL-SCNC: 25 MMOL/L (ref 23–29)
COMPLEXED PSA SERPL-MCNC: 0.41 NG/ML (ref 0–4)
CREAT SERPL-MCNC: 1.3 MG/DL (ref 0.5–1.4)
DIFFERENTIAL METHOD: NORMAL
EOSINOPHIL # BLD AUTO: 0 K/UL (ref 0–0.5)
EOSINOPHIL NFR BLD: 0.5 % (ref 0–8)
ERYTHROCYTE [DISTWIDTH] IN BLOOD BY AUTOMATED COUNT: 12.6 % (ref 11.5–14.5)
EST. GFR  (AFRICAN AMERICAN): >60 ML/MIN/1.73 M^2
EST. GFR  (NON AFRICAN AMERICAN): >60 ML/MIN/1.73 M^2
ESTIMATED AVG GLUCOSE: 105 MG/DL (ref 68–131)
GLUCOSE SERPL-MCNC: 85 MG/DL (ref 70–110)
HBA1C MFR BLD HPLC: 5.3 % (ref 4–5.6)
HCT VFR BLD AUTO: 45.4 % (ref 40–54)
HDLC SERPL-MCNC: 54 MG/DL (ref 40–75)
HDLC SERPL: 27 % (ref 20–50)
HGB BLD-MCNC: 14.6 G/DL (ref 14–18)
IMM GRANULOCYTES # BLD AUTO: 0.02 K/UL (ref 0–0.04)
IMM GRANULOCYTES NFR BLD AUTO: 0.3 % (ref 0–0.5)
LDLC SERPL CALC-MCNC: 135.6 MG/DL (ref 63–159)
LYMPHOCYTES # BLD AUTO: 1.6 K/UL (ref 1–4.8)
LYMPHOCYTES NFR BLD: 27.8 % (ref 18–48)
MCH RBC QN AUTO: 30 PG (ref 27–31)
MCHC RBC AUTO-ENTMCNC: 32.2 G/DL (ref 32–36)
MCV RBC AUTO: 93 FL (ref 82–98)
MONOCYTES # BLD AUTO: 0.5 K/UL (ref 0.3–1)
MONOCYTES NFR BLD: 8.6 % (ref 4–15)
NEUTROPHILS # BLD AUTO: 3.6 K/UL (ref 1.8–7.7)
NEUTROPHILS NFR BLD: 62.5 % (ref 38–73)
NONHDLC SERPL-MCNC: 146 MG/DL
NRBC BLD-RTO: 0 /100 WBC
PLATELET # BLD AUTO: 238 K/UL (ref 150–350)
PMV BLD AUTO: 11.2 FL (ref 9.2–12.9)
POTASSIUM SERPL-SCNC: 4.8 MMOL/L (ref 3.5–5.1)
PROT SERPL-MCNC: 7.5 G/DL (ref 6–8.4)
RBC # BLD AUTO: 4.87 M/UL (ref 4.6–6.2)
SODIUM SERPL-SCNC: 141 MMOL/L (ref 136–145)
TRIGL SERPL-MCNC: 52 MG/DL (ref 30–150)
TSH SERPL DL<=0.005 MIU/L-ACNC: 0.56 UIU/ML (ref 0.4–4)
WBC # BLD AUTO: 5.82 K/UL (ref 3.9–12.7)

## 2020-10-01 PROCEDURE — 80061 LIPID PANEL: CPT

## 2020-10-01 PROCEDURE — 84443 ASSAY THYROID STIM HORMONE: CPT

## 2020-10-01 PROCEDURE — 85025 COMPLETE CBC W/AUTO DIFF WBC: CPT

## 2020-10-01 PROCEDURE — 3008F PR BODY MASS INDEX (BMI) DOCUMENTED: ICD-10-PCS | Mod: CPTII,S$GLB,, | Performed by: FAMILY MEDICINE

## 2020-10-01 PROCEDURE — 83036 HEMOGLOBIN GLYCOSYLATED A1C: CPT

## 2020-10-01 PROCEDURE — 99396 PREV VISIT EST AGE 40-64: CPT | Mod: S$GLB,,, | Performed by: FAMILY MEDICINE

## 2020-10-01 PROCEDURE — 36415 COLL VENOUS BLD VENIPUNCTURE: CPT | Mod: PN

## 2020-10-01 PROCEDURE — 80053 COMPREHEN METABOLIC PANEL: CPT

## 2020-10-01 PROCEDURE — 3008F BODY MASS INDEX DOCD: CPT | Mod: CPTII,S$GLB,, | Performed by: FAMILY MEDICINE

## 2020-10-01 PROCEDURE — 99999 PR PBB SHADOW E&M-EST. PATIENT-LVL IV: CPT | Mod: PBBFAC,,, | Performed by: FAMILY MEDICINE

## 2020-10-01 PROCEDURE — 84153 ASSAY OF PSA TOTAL: CPT

## 2020-10-01 PROCEDURE — 99396 PR PREVENTIVE VISIT,EST,40-64: ICD-10-PCS | Mod: S$GLB,,, | Performed by: FAMILY MEDICINE

## 2020-10-01 PROCEDURE — 99999 PR PBB SHADOW E&M-EST. PATIENT-LVL IV: ICD-10-PCS | Mod: PBBFAC,,, | Performed by: FAMILY MEDICINE

## 2020-10-01 NOTE — PROGRESS NOTES
Subjective:       Patient ID: Hector Torres is a 55 y.o. male.    Chief Complaint: Annual Exam      54 yo male presents for annual physical exam. He follows with Dr. Salvador. This is his first visit with me.    No acute complaints, generally feels well.      PMH:   Melanoma in situ, Derm in [place] 9/19.   Latent TB treated.   H/O Heart murmur.      PSH: Skin cancer excision, Eye surgery at age 2.      Lipid disorders/ASCVD risk (ages >/= 45 or >/= 20 if increased risk ):  Ordered  DM (>45y yearly or if obese, HTN):  Ordered  Prostate (per discussion with patient starting at 50y or 45y if high risk):  Ordered    The following portions of the patient's history were reviewed and updated as appropriate: allergies, current medications, past family history, past medical history, past social history, past surgical history and problem list.      Review of Systems   Constitutional: Negative for activity change, appetite change, chills, diaphoresis, fatigue, fever and unexpected weight change.   HENT: Negative for nasal congestion, dental problem, facial swelling, nosebleeds, postnasal drip, rhinorrhea, sore throat, tinnitus and trouble swallowing.    Eyes: Negative for pain, discharge, itching and visual disturbance.   Respiratory: Negative for apnea, chest tightness, shortness of breath, wheezing and stridor.    Cardiovascular: Negative for chest pain, palpitations and leg swelling.   Gastrointestinal: Negative for abdominal distention, abdominal pain, constipation, diarrhea, nausea, rectal pain and vomiting.   Endocrine: Negative for cold intolerance, heat intolerance and polyuria.   Genitourinary: Negative for difficulty urinating, dysuria, frequency, hematuria and urgency.   Musculoskeletal: Negative for arthralgias, gait problem, myalgias, neck pain and neck stiffness.   Integumentary:  Negative for color change and rash.   Neurological: Negative for dizziness, tremors, seizures, syncope, facial asymmetry,  "weakness and headaches.   Hematological: Negative for adenopathy. Does not bruise/bleed easily.   Psychiatric/Behavioral: Negative for agitation, confusion, hallucinations, self-injury and suicidal ideas. The patient is not hyperactive.           Objective:       Vitals:    10/01/20 0825   BP: 114/82   Pulse: 78   Temp: 98.3 °F (36.8 °C)   TempSrc: Oral   SpO2: 96%   Weight: 89.4 kg (197 lb 1.5 oz)   Height: 5' 9.25" (1.759 m)     Physical Exam  Constitutional:       General: He is not in acute distress.     Appearance: He is well-developed. He is not diaphoretic.   HENT:      Head: Normocephalic and atraumatic.      Right Ear: External ear normal.      Left Ear: External ear normal.   Eyes:      General: No scleral icterus.        Right eye: No discharge.         Left eye: No discharge.      Conjunctiva/sclera: Conjunctivae normal.      Pupils: Pupils are equal, round, and reactive to light.   Neck:      Musculoskeletal: Normal range of motion and neck supple.      Thyroid: No thyromegaly.   Cardiovascular:      Rate and Rhythm: Normal rate and regular rhythm.      Heart sounds: Normal heart sounds. No murmur. No friction rub. No gallop.    Pulmonary:      Effort: Pulmonary effort is normal. No respiratory distress.      Breath sounds: Normal breath sounds.   Chest:      Chest wall: No tenderness.   Abdominal:      General: Bowel sounds are normal. There is no distension.      Palpations: Abdomen is soft. There is no mass.      Tenderness: There is no abdominal tenderness. There is no guarding or rebound.   Genitourinary:     Comments: Genitourinary:  Penis circumcised without lesions, urethral meatus normal location without discharge, testes and epididymides normal sized without masses, scrotum with dark blue/red papules consistent with angiokeratoma of [place]  Musculoskeletal: Normal range of motion.   Lymphadenopathy:      Cervical: No cervical adenopathy.   Skin:     General: Skin is warm.      Capillary " Refill: Capillary refill takes less than 2 seconds.      Findings: No rash.   Neurological:      Mental Status: He is alert and oriented to person, place, and time.      Cranial Nerves: No cranial nerve deficit.      Motor: No abnormal muscle tone.      Coordination: Coordination normal.      Deep Tendon Reflexes: Reflexes normal.   Psychiatric:         Thought Content: Thought content normal.         Judgment: Judgment normal.         Assessment:       1. Annual physical exam    2. Laboratory exam ordered as part of routine general medical examination    3. HIV screening declined    4. History of melanoma in situ        Plan:       1. Annual physical exam  Age appropriate prevention guidelines implemented, unless patient refused  Encourage Advanced directives  Labs ordered   Immunizations reviewed. Encourage yearly influenza vaccine.  Patient Counseling:  --Nutrition: Encourage healthy food choices, adequate water intake, moderate sodium/caffeine intake, diet low in saturated fat and cholesterol. Importance of caloric balance and sufficient intake of fresh fruits, vegetables, fiber, calcium, iron, and 1 mg of folate supplement per day (for females capable of pregnancy).  --Exercise: Stressed the importance of regular exercise.   --Substance Abuse: Abstinence from tobacco products. No more than 2 alcoholic drinks per day in men or 1 alcoholic drink per day in women. Avoid illicit drugs, driving or other dangerous activities under the influence. In the event of abuse, treatment offered.   --Sexuality: Safe sex practices to avoid sexually transmitted diseases; careful partner selection, use of condoms and contraceptive alternatives, avoidance of unintended pregnancy in fertile women of child-bearing age  --Injury prevention: encourage safety belts, safety helmets, smoke detector.  --Dental health: Discussed importance of regular tooth brushing X2 daily, flossing X1 daily, and routine dental visits.  --Encourage use of  sun screen, wear sun protective clothing  --Encourage routine eye exams    2. Laboratory exam ordered as part of routine general medical examination  -     CBC auto differential; Future; Expected date: 10/01/2020  -     Comprehensive metabolic panel; Future; Expected date: 10/01/2020  -     Hemoglobin A1C; Future; Expected date: 10/01/2020  -     Lipid Panel; Future; Expected date: 10/01/2020  -     PSA, Screening; Future; Expected date: 10/01/2020  -     TSH; Future; Expected date: 10/01/2020    3. HIV screening declined  No high risk behavior    4. History of melanoma in situ  It would be wise to keep up with dermatology visits for skin screenings.    Disclaimer: This note has been generated using voice-recognition software. There may be typographical errors that have been missed during proof-reading

## 2020-10-05 ENCOUNTER — TELEPHONE (OUTPATIENT)
Dept: PRIMARY CARE CLINIC | Facility: CLINIC | Age: 55
End: 2020-10-05

## 2020-10-05 NOTE — TELEPHONE ENCOUNTER
----- Message from Emily Fournier MD sent at 10/5/2020 11:11 AM CDT -----  Please let patient know results sent to portal     Hello,    Normal thyroid function testing    Liver function within normal limits.  The elevated total bilirubin does not require any further intervention.    Normal kidney function    Total cholesterol is a little elevated.  Your calculated risk of heart disease and stroke is low.  No cholesterol medications indicated at this time.  Low-cholesterol food choices and exercise encouraged.    Prostate testing within normal limits    No diabetes    Cell count stable.  No anemia.

## 2020-10-05 NOTE — TELEPHONE ENCOUNTER
IGNACIO for patient informing that Dr. Fournier sent a portal message regarding your test results.  If you have a problem viewing the message, you can call the office back at [phone].

## 2021-03-26 ENCOUNTER — IMMUNIZATION (OUTPATIENT)
Dept: PRIMARY CARE CLINIC | Facility: CLINIC | Age: 56
End: 2021-03-26

## 2021-03-26 DIAGNOSIS — Z23 NEED FOR VACCINATION: Primary | ICD-10-CM

## 2021-03-26 PROCEDURE — 91300 PR SARS-COV- 2 COVID-19 VACCINE, NO PRSV, 30MCG/0.3ML, IM: CPT | Mod: S$GLB,,, | Performed by: INTERNAL MEDICINE

## 2021-03-26 PROCEDURE — 0001A PR IMMUNIZ ADMIN, SARS-COV-2 COVID-19 VACC, 30MCG/0.3ML, 1ST DOSE: CPT | Mod: CV19,S$GLB,, | Performed by: INTERNAL MEDICINE

## 2021-03-26 PROCEDURE — 0001A PR IMMUNIZ ADMIN, SARS-COV-2 COVID-19 VACC, 30MCG/0.3ML, 1ST DOSE: ICD-10-PCS | Mod: CV19,S$GLB,, | Performed by: INTERNAL MEDICINE

## 2021-03-26 PROCEDURE — 91300 PR SARS-COV- 2 COVID-19 VACCINE, NO PRSV, 30MCG/0.3ML, IM: ICD-10-PCS | Mod: S$GLB,,, | Performed by: INTERNAL MEDICINE

## 2021-03-26 RX ADMIN — Medication 0.3 ML: at 12:03

## 2021-04-18 ENCOUNTER — IMMUNIZATION (OUTPATIENT)
Dept: PRIMARY CARE CLINIC | Facility: CLINIC | Age: 56
End: 2021-04-18
Payer: COMMERCIAL

## 2021-04-18 DIAGNOSIS — Z23 NEED FOR VACCINATION: Primary | ICD-10-CM

## 2021-04-18 PROCEDURE — 91300 PR SARS-COV- 2 COVID-19 VACCINE, NO PRSV, 30MCG/0.3ML, IM: CPT | Mod: S$GLB,,, | Performed by: INTERNAL MEDICINE

## 2021-04-18 PROCEDURE — 91300 PR SARS-COV- 2 COVID-19 VACCINE, NO PRSV, 30MCG/0.3ML, IM: ICD-10-PCS | Mod: S$GLB,,, | Performed by: INTERNAL MEDICINE

## 2021-04-18 PROCEDURE — 0002A PR IMMUNIZ ADMIN, SARS-COV-2 COVID-19 VACC, 30MCG/0.3ML, 2ND DOSE: CPT | Mod: CV19,S$GLB,, | Performed by: INTERNAL MEDICINE

## 2021-04-18 PROCEDURE — 0002A PR IMMUNIZ ADMIN, SARS-COV-2 COVID-19 VACC, 30MCG/0.3ML, 2ND DOSE: ICD-10-PCS | Mod: CV19,S$GLB,, | Performed by: INTERNAL MEDICINE

## 2021-04-18 RX ADMIN — Medication 0.3 ML: at 12:04

## 2021-08-10 ENCOUNTER — OFFICE VISIT (OUTPATIENT)
Dept: URGENT CARE | Facility: CLINIC | Age: 56
End: 2021-08-10
Payer: COMMERCIAL

## 2021-08-10 ENCOUNTER — TELEPHONE (OUTPATIENT)
Dept: PRIMARY CARE CLINIC | Facility: CLINIC | Age: 56
End: 2021-08-10

## 2021-08-10 VITALS
HEART RATE: 61 BPM | HEIGHT: 69 IN | OXYGEN SATURATION: 98 % | WEIGHT: 197 LBS | SYSTOLIC BLOOD PRESSURE: 128 MMHG | DIASTOLIC BLOOD PRESSURE: 84 MMHG | BODY MASS INDEX: 29.18 KG/M2 | TEMPERATURE: 98 F | RESPIRATION RATE: 16 BRPM

## 2021-08-10 DIAGNOSIS — S39.012A BACK STRAIN, INITIAL ENCOUNTER: ICD-10-CM

## 2021-08-10 DIAGNOSIS — M54.6 ACUTE RIGHT-SIDED THORACIC BACK PAIN: Primary | ICD-10-CM

## 2021-08-10 LAB
BILIRUB UR QL STRIP: NEGATIVE
GLUCOSE UR QL STRIP: NEGATIVE
KETONES UR QL STRIP: NEGATIVE
LEUKOCYTE ESTERASE UR QL STRIP: NEGATIVE
PH, POC UA: 5.5 (ref 5–8)
POC BLOOD, URINE: NEGATIVE
POC NITRATES, URINE: NEGATIVE
PROT UR QL STRIP: NEGATIVE
SP GR UR STRIP: 1.01 (ref 1–1.03)
UROBILINOGEN UR STRIP-ACNC: NORMAL (ref 0.3–2.2)

## 2021-08-10 PROCEDURE — 3074F PR MOST RECENT SYSTOLIC BLOOD PRESSURE < 130 MM HG: ICD-10-PCS | Mod: CPTII,S$GLB,, | Performed by: PHYSICIAN ASSISTANT

## 2021-08-10 PROCEDURE — 3008F BODY MASS INDEX DOCD: CPT | Mod: CPTII,S$GLB,, | Performed by: PHYSICIAN ASSISTANT

## 2021-08-10 PROCEDURE — 1159F MED LIST DOCD IN RCRD: CPT | Mod: CPTII,S$GLB,, | Performed by: PHYSICIAN ASSISTANT

## 2021-08-10 PROCEDURE — 3079F PR MOST RECENT DIASTOLIC BLOOD PRESSURE 80-89 MM HG: ICD-10-PCS | Mod: CPTII,S$GLB,, | Performed by: PHYSICIAN ASSISTANT

## 2021-08-10 PROCEDURE — 1159F PR MEDICATION LIST DOCUMENTED IN MEDICAL RECORD: ICD-10-PCS | Mod: CPTII,S$GLB,, | Performed by: PHYSICIAN ASSISTANT

## 2021-08-10 PROCEDURE — 99214 OFFICE O/P EST MOD 30 MIN: CPT | Mod: 25,S$GLB,, | Performed by: PHYSICIAN ASSISTANT

## 2021-08-10 PROCEDURE — 99214 PR OFFICE/OUTPT VISIT, EST, LEVL IV, 30-39 MIN: ICD-10-PCS | Mod: 25,S$GLB,, | Performed by: PHYSICIAN ASSISTANT

## 2021-08-10 PROCEDURE — 1160F RVW MEDS BY RX/DR IN RCRD: CPT | Mod: CPTII,S$GLB,, | Performed by: PHYSICIAN ASSISTANT

## 2021-08-10 PROCEDURE — 3008F PR BODY MASS INDEX (BMI) DOCUMENTED: ICD-10-PCS | Mod: CPTII,S$GLB,, | Performed by: PHYSICIAN ASSISTANT

## 2021-08-10 PROCEDURE — 1160F PR REVIEW ALL MEDS BY PRESCRIBER/CLIN PHARMACIST DOCUMENTED: ICD-10-PCS | Mod: CPTII,S$GLB,, | Performed by: PHYSICIAN ASSISTANT

## 2021-08-10 PROCEDURE — 81003 URINALYSIS AUTO W/O SCOPE: CPT | Mod: QW,S$GLB,, | Performed by: PHYSICIAN ASSISTANT

## 2021-08-10 PROCEDURE — 81003 POCT URINALYSIS, DIPSTICK, AUTOMATED, W/O SCOPE: ICD-10-PCS | Mod: QW,S$GLB,, | Performed by: PHYSICIAN ASSISTANT

## 2021-08-10 PROCEDURE — 3074F SYST BP LT 130 MM HG: CPT | Mod: CPTII,S$GLB,, | Performed by: PHYSICIAN ASSISTANT

## 2021-08-10 PROCEDURE — 3079F DIAST BP 80-89 MM HG: CPT | Mod: CPTII,S$GLB,, | Performed by: PHYSICIAN ASSISTANT

## 2021-09-26 ENCOUNTER — LAB VISIT (OUTPATIENT)
Dept: LAB | Facility: HOSPITAL | Age: 56
End: 2021-09-26
Attending: INTERNAL MEDICINE
Payer: COMMERCIAL

## 2021-09-26 DIAGNOSIS — Z20.822 ENCOUNTER FOR LABORATORY TESTING FOR COVID-19 VIRUS: ICD-10-CM

## 2021-09-26 LAB — SARS-COV-2 RNA RESP QL NAA+PROBE: NOT DETECTED

## 2021-09-26 PROCEDURE — U0003 INFECTIOUS AGENT DETECTION BY NUCLEIC ACID (DNA OR RNA); SEVERE ACUTE RESPIRATORY SYNDROME CORONAVIRUS 2 (SARS-COV-2) (CORONAVIRUS DISEASE [COVID-19]), AMPLIFIED PROBE TECHNIQUE, MAKING USE OF HIGH THROUGHPUT TECHNOLOGIES AS DESCRIBED BY CMS-2020-01-R: HCPCS | Performed by: INTERNAL MEDICINE

## 2021-09-26 PROCEDURE — U0005 INFEC AGEN DETEC AMPLI PROBE: HCPCS | Performed by: INTERNAL MEDICINE

## 2021-10-12 ENCOUNTER — OFFICE VISIT (OUTPATIENT)
Dept: PRIMARY CARE CLINIC | Facility: CLINIC | Age: 56
End: 2021-10-12
Payer: COMMERCIAL

## 2021-10-12 ENCOUNTER — LAB VISIT (OUTPATIENT)
Dept: LAB | Facility: HOSPITAL | Age: 56
End: 2021-10-12
Attending: INTERNAL MEDICINE
Payer: COMMERCIAL

## 2021-10-12 VITALS
SYSTOLIC BLOOD PRESSURE: 138 MMHG | RESPIRATION RATE: 18 BRPM | HEIGHT: 69 IN | DIASTOLIC BLOOD PRESSURE: 78 MMHG | BODY MASS INDEX: 32.26 KG/M2 | WEIGHT: 217.81 LBS | HEART RATE: 69 BPM | TEMPERATURE: 98 F | OXYGEN SATURATION: 98 %

## 2021-10-12 DIAGNOSIS — Z23 INFLUENZA VACCINE NEEDED: ICD-10-CM

## 2021-10-12 DIAGNOSIS — Z00.00 ROUTINE MEDICAL EXAM: ICD-10-CM

## 2021-10-12 DIAGNOSIS — Z12.5 PROSTATE CANCER SCREENING: ICD-10-CM

## 2021-10-12 DIAGNOSIS — E66.9 OBESITY (BMI 30.0-34.9): ICD-10-CM

## 2021-10-12 DIAGNOSIS — Z00.00 ROUTINE MEDICAL EXAM: Primary | ICD-10-CM

## 2021-10-12 DIAGNOSIS — Z23 NEED FOR SHINGLES VACCINE: ICD-10-CM

## 2021-10-12 DIAGNOSIS — R03.0 ELEVATED BLOOD-PRESSURE READING WITHOUT DIAGNOSIS OF HYPERTENSION: ICD-10-CM

## 2021-10-12 LAB
ALBUMIN SERPL BCP-MCNC: 4.6 G/DL (ref 3.5–5.2)
ALP SERPL-CCNC: 46 U/L (ref 55–135)
ALT SERPL W/O P-5'-P-CCNC: 35 U/L (ref 10–44)
ANION GAP SERPL CALC-SCNC: 12 MMOL/L (ref 8–16)
AST SERPL-CCNC: 24 U/L (ref 10–40)
BILIRUB SERPL-MCNC: 1.4 MG/DL (ref 0.1–1)
BILIRUB UR QL STRIP: NEGATIVE
BUN SERPL-MCNC: 12 MG/DL (ref 6–20)
CALCIUM SERPL-MCNC: 9.1 MG/DL (ref 8.7–10.5)
CHLORIDE SERPL-SCNC: 104 MMOL/L (ref 95–110)
CHOLEST SERPL-MCNC: 216 MG/DL (ref 120–199)
CHOLEST/HDLC SERPL: 3.7 {RATIO} (ref 2–5)
CLARITY UR REFRACT.AUTO: CLEAR
CO2 SERPL-SCNC: 25 MMOL/L (ref 23–29)
COLOR UR AUTO: YELLOW
COMPLEXED PSA SERPL-MCNC: 0.49 NG/ML (ref 0–4)
CREAT SERPL-MCNC: 1 MG/DL (ref 0.5–1.4)
ERYTHROCYTE [DISTWIDTH] IN BLOOD BY AUTOMATED COUNT: 12.9 % (ref 11.5–14.5)
EST. GFR  (AFRICAN AMERICAN): >60 ML/MIN/1.73 M^2
EST. GFR  (NON AFRICAN AMERICAN): >60 ML/MIN/1.73 M^2
GLUCOSE SERPL-MCNC: 96 MG/DL (ref 70–110)
GLUCOSE UR QL STRIP: NEGATIVE
HCT VFR BLD AUTO: 41.7 % (ref 40–54)
HDLC SERPL-MCNC: 58 MG/DL (ref 40–75)
HDLC SERPL: 26.9 % (ref 20–50)
HGB BLD-MCNC: 13.5 G/DL (ref 14–18)
HGB UR QL STRIP: NEGATIVE
KETONES UR QL STRIP: NEGATIVE
LDLC SERPL CALC-MCNC: 138.2 MG/DL (ref 63–159)
LEUKOCYTE ESTERASE UR QL STRIP: NEGATIVE
MCH RBC QN AUTO: 29.9 PG (ref 27–31)
MCHC RBC AUTO-ENTMCNC: 32.4 G/DL (ref 32–36)
MCV RBC AUTO: 93 FL (ref 82–98)
NITRITE UR QL STRIP: NEGATIVE
NONHDLC SERPL-MCNC: 158 MG/DL
PH UR STRIP: 6 [PH] (ref 5–8)
PLATELET # BLD AUTO: 252 K/UL (ref 150–450)
PMV BLD AUTO: 11 FL (ref 9.2–12.9)
POTASSIUM SERPL-SCNC: 3.9 MMOL/L (ref 3.5–5.1)
PROT SERPL-MCNC: 7.4 G/DL (ref 6–8.4)
PROT UR QL STRIP: NEGATIVE
RBC # BLD AUTO: 4.51 M/UL (ref 4.6–6.2)
SODIUM SERPL-SCNC: 141 MMOL/L (ref 136–145)
SP GR UR STRIP: 1.02 (ref 1–1.03)
TRIGL SERPL-MCNC: 99 MG/DL (ref 30–150)
URN SPEC COLLECT METH UR: NORMAL
WBC # BLD AUTO: 7.03 K/UL (ref 3.9–12.7)

## 2021-10-12 PROCEDURE — 80053 COMPREHEN METABOLIC PANEL: CPT | Performed by: INTERNAL MEDICINE

## 2021-10-12 PROCEDURE — 81003 URINALYSIS AUTO W/O SCOPE: CPT | Performed by: INTERNAL MEDICINE

## 2021-10-12 PROCEDURE — 3075F SYST BP GE 130 - 139MM HG: CPT | Mod: CPTII,S$GLB,, | Performed by: INTERNAL MEDICINE

## 2021-10-12 PROCEDURE — 3008F BODY MASS INDEX DOCD: CPT | Mod: CPTII,S$GLB,, | Performed by: INTERNAL MEDICINE

## 2021-10-12 PROCEDURE — 3008F PR BODY MASS INDEX (BMI) DOCUMENTED: ICD-10-PCS | Mod: CPTII,S$GLB,, | Performed by: INTERNAL MEDICINE

## 2021-10-12 PROCEDURE — 36415 COLL VENOUS BLD VENIPUNCTURE: CPT | Mod: PN | Performed by: INTERNAL MEDICINE

## 2021-10-12 PROCEDURE — 99999 PR PBB SHADOW E&M-EST. PATIENT-LVL IV: CPT | Mod: PBBFAC,,, | Performed by: INTERNAL MEDICINE

## 2021-10-12 PROCEDURE — 3078F DIAST BP <80 MM HG: CPT | Mod: CPTII,S$GLB,, | Performed by: INTERNAL MEDICINE

## 2021-10-12 PROCEDURE — 3075F PR MOST RECENT SYSTOLIC BLOOD PRESS GE 130-139MM HG: ICD-10-PCS | Mod: CPTII,S$GLB,, | Performed by: INTERNAL MEDICINE

## 2021-10-12 PROCEDURE — 99396 PREV VISIT EST AGE 40-64: CPT | Mod: S$GLB,,, | Performed by: INTERNAL MEDICINE

## 2021-10-12 PROCEDURE — 84153 ASSAY OF PSA TOTAL: CPT | Performed by: INTERNAL MEDICINE

## 2021-10-12 PROCEDURE — 1160F RVW MEDS BY RX/DR IN RCRD: CPT | Mod: CPTII,S$GLB,, | Performed by: INTERNAL MEDICINE

## 2021-10-12 PROCEDURE — 99396 PR PREVENTIVE VISIT,EST,40-64: ICD-10-PCS | Mod: S$GLB,,, | Performed by: INTERNAL MEDICINE

## 2021-10-12 PROCEDURE — 80061 LIPID PANEL: CPT | Performed by: INTERNAL MEDICINE

## 2021-10-12 PROCEDURE — 1160F PR REVIEW ALL MEDS BY PRESCRIBER/CLIN PHARMACIST DOCUMENTED: ICD-10-PCS | Mod: CPTII,S$GLB,, | Performed by: INTERNAL MEDICINE

## 2021-10-12 PROCEDURE — 1159F MED LIST DOCD IN RCRD: CPT | Mod: CPTII,S$GLB,, | Performed by: INTERNAL MEDICINE

## 2021-10-12 PROCEDURE — 1159F PR MEDICATION LIST DOCUMENTED IN MEDICAL RECORD: ICD-10-PCS | Mod: CPTII,S$GLB,, | Performed by: INTERNAL MEDICINE

## 2021-10-12 PROCEDURE — 3078F PR MOST RECENT DIASTOLIC BLOOD PRESSURE < 80 MM HG: ICD-10-PCS | Mod: CPTII,S$GLB,, | Performed by: INTERNAL MEDICINE

## 2021-10-12 PROCEDURE — 99999 PR PBB SHADOW E&M-EST. PATIENT-LVL IV: ICD-10-PCS | Mod: PBBFAC,,, | Performed by: INTERNAL MEDICINE

## 2021-10-12 PROCEDURE — 85027 COMPLETE CBC AUTOMATED: CPT | Performed by: INTERNAL MEDICINE

## 2021-10-13 ENCOUNTER — PATIENT MESSAGE (OUTPATIENT)
Dept: PRIMARY CARE CLINIC | Facility: CLINIC | Age: 56
End: 2021-10-13
Payer: COMMERCIAL

## 2021-10-31 ENCOUNTER — IMMUNIZATION (OUTPATIENT)
Dept: PRIMARY CARE CLINIC | Facility: CLINIC | Age: 56
End: 2021-10-31
Payer: COMMERCIAL

## 2021-10-31 DIAGNOSIS — Z23 NEED FOR VACCINATION: Primary | ICD-10-CM

## 2021-10-31 PROCEDURE — 0003A COVID-19, MRNA, LNP-S, PF, 30 MCG/0.3 ML DOSE VACCINE: CPT | Mod: CV19,PBBFAC | Performed by: INTERNAL MEDICINE

## 2021-10-31 PROCEDURE — 91300 COVID-19, MRNA, LNP-S, PF, 30 MCG/0.3 ML DOSE VACCINE: CPT | Mod: PBBFAC | Performed by: INTERNAL MEDICINE

## 2021-12-12 ENCOUNTER — TELEPHONE (OUTPATIENT)
Dept: URGENT CARE | Facility: CLINIC | Age: 56
End: 2021-12-12
Payer: COMMERCIAL

## 2021-12-12 ENCOUNTER — LAB VISIT (OUTPATIENT)
Dept: LAB | Facility: HOSPITAL | Age: 56
End: 2021-12-12
Attending: STUDENT IN AN ORGANIZED HEALTH CARE EDUCATION/TRAINING PROGRAM
Payer: COMMERCIAL

## 2021-12-12 DIAGNOSIS — Z20.822 ENCOUNTER FOR LABORATORY TESTING FOR COVID-19 VIRUS: ICD-10-CM

## 2021-12-12 LAB — SARS-COV-2 RNA RESP QL NAA+PROBE: NOT DETECTED

## 2021-12-12 PROCEDURE — U0005 INFEC AGEN DETEC AMPLI PROBE: HCPCS | Performed by: INTERNAL MEDICINE

## 2021-12-12 PROCEDURE — U0003 INFECTIOUS AGENT DETECTION BY NUCLEIC ACID (DNA OR RNA); SEVERE ACUTE RESPIRATORY SYNDROME CORONAVIRUS 2 (SARS-COV-2) (CORONAVIRUS DISEASE [COVID-19]), AMPLIFIED PROBE TECHNIQUE, MAKING USE OF HIGH THROUGHPUT TECHNOLOGIES AS DESCRIBED BY CMS-2020-01-R: HCPCS | Performed by: INTERNAL MEDICINE

## 2022-01-18 ENCOUNTER — LAB VISIT (OUTPATIENT)
Dept: LAB | Facility: HOSPITAL | Age: 57
End: 2022-01-18
Payer: COMMERCIAL

## 2022-01-18 DIAGNOSIS — Z20.822 ENCOUNTER FOR LABORATORY TESTING FOR COVID-19 VIRUS: ICD-10-CM

## 2022-01-18 LAB — SARS-COV-2 RNA RESP QL NAA+PROBE: NOT DETECTED

## 2022-01-18 PROCEDURE — U0003 INFECTIOUS AGENT DETECTION BY NUCLEIC ACID (DNA OR RNA); SEVERE ACUTE RESPIRATORY SYNDROME CORONAVIRUS 2 (SARS-COV-2) (CORONAVIRUS DISEASE [COVID-19]), AMPLIFIED PROBE TECHNIQUE, MAKING USE OF HIGH THROUGHPUT TECHNOLOGIES AS DESCRIBED BY CMS-2020-01-R: HCPCS | Performed by: INTERNAL MEDICINE

## 2022-01-18 PROCEDURE — U0005 INFEC AGEN DETEC AMPLI PROBE: HCPCS | Performed by: INTERNAL MEDICINE

## 2022-01-28 ENCOUNTER — TELEPHONE (OUTPATIENT)
Dept: INFECTIOUS DISEASES | Facility: CLINIC | Age: 57
End: 2022-01-28
Payer: COMMERCIAL

## 2022-01-28 ENCOUNTER — LAB VISIT (OUTPATIENT)
Dept: LAB | Facility: HOSPITAL | Age: 57
End: 2022-01-28
Payer: COMMERCIAL

## 2022-01-28 DIAGNOSIS — Z20.822 ENCOUNTER FOR LABORATORY TESTING FOR COVID-19 VIRUS: ICD-10-CM

## 2022-01-28 LAB — SARS-COV-2 RNA RESP QL NAA+PROBE: NOT DETECTED

## 2022-01-28 PROCEDURE — U0003 INFECTIOUS AGENT DETECTION BY NUCLEIC ACID (DNA OR RNA); SEVERE ACUTE RESPIRATORY SYNDROME CORONAVIRUS 2 (SARS-COV-2) (CORONAVIRUS DISEASE [COVID-19]), AMPLIFIED PROBE TECHNIQUE, MAKING USE OF HIGH THROUGHPUT TECHNOLOGIES AS DESCRIBED BY CMS-2020-01-R: HCPCS | Performed by: INTERNAL MEDICINE

## 2022-01-28 PROCEDURE — U0005 INFEC AGEN DETEC AMPLI PROBE: HCPCS | Performed by: INTERNAL MEDICINE

## 2022-02-25 ENCOUNTER — CLINICAL SUPPORT (OUTPATIENT)
Dept: URGENT CARE | Facility: CLINIC | Age: 57
End: 2022-02-25
Payer: COMMERCIAL

## 2022-02-25 DIAGNOSIS — Z11.52 ENCOUNTER FOR SCREENING FOR COVID-19: Primary | ICD-10-CM

## 2022-02-25 PROCEDURE — U0005 INFEC AGEN DETEC AMPLI PROBE: HCPCS | Performed by: NURSE PRACTITIONER

## 2022-02-25 PROCEDURE — U0003 INFECTIOUS AGENT DETECTION BY NUCLEIC ACID (DNA OR RNA); SEVERE ACUTE RESPIRATORY SYNDROME CORONAVIRUS 2 (SARS-COV-2) (CORONAVIRUS DISEASE [COVID-19]), AMPLIFIED PROBE TECHNIQUE, MAKING USE OF HIGH THROUGHPUT TECHNOLOGIES AS DESCRIBED BY CMS-2020-01-R: HCPCS | Performed by: NURSE PRACTITIONER

## 2022-02-26 LAB — SARS-COV-2 RNA RESP QL NAA+PROBE: NOT DETECTED

## 2022-05-25 ENCOUNTER — TELEPHONE (OUTPATIENT)
Dept: PRIMARY CARE CLINIC | Facility: CLINIC | Age: 57
End: 2022-05-25
Payer: COMMERCIAL

## 2022-05-25 NOTE — TELEPHONE ENCOUNTER
----- Message from Jessica Rosales sent at 5/25/2022  8:51 AM CDT -----  Contact: Pt [phone]  [email or web address]     Patient is calling for Medical Advice regarding:Cough, fatigue and chest congestion     How long has patient had these symptoms:1 week or a little longer     Pharmacy name and phone#:  [id number] - [place], [place] - [address] AT [facility] [address]  [facility] [address]  Phone: [phone] Fax: [phone]        Would like response via Thoundst:  Call back     Comments:  Pt was [place] and tested positive for covid on 5/19 and clearance to come back home and is now experiencing those symptoms.

## 2022-05-26 ENCOUNTER — OFFICE VISIT (OUTPATIENT)
Dept: PRIMARY CARE CLINIC | Facility: CLINIC | Age: 57
End: 2022-05-26
Payer: COMMERCIAL

## 2022-05-26 VITALS
WEIGHT: 210.31 LBS | OXYGEN SATURATION: 98 % | DIASTOLIC BLOOD PRESSURE: 76 MMHG | TEMPERATURE: 98 F | RESPIRATION RATE: 18 BRPM | HEART RATE: 65 BPM | BODY MASS INDEX: 31.15 KG/M2 | SYSTOLIC BLOOD PRESSURE: 114 MMHG | HEIGHT: 69 IN

## 2022-05-26 DIAGNOSIS — J30.2 SEASONAL ALLERGIES: Primary | ICD-10-CM

## 2022-05-26 PROCEDURE — 1160F PR REVIEW ALL MEDS BY PRESCRIBER/CLIN PHARMACIST DOCUMENTED: ICD-10-PCS | Mod: CPTII,S$GLB,, | Performed by: NURSE PRACTITIONER

## 2022-05-26 PROCEDURE — 3078F PR MOST RECENT DIASTOLIC BLOOD PRESSURE < 80 MM HG: ICD-10-PCS | Mod: CPTII,S$GLB,, | Performed by: NURSE PRACTITIONER

## 2022-05-26 PROCEDURE — 99999 PR PBB SHADOW E&M-EST. PATIENT-LVL IV: CPT | Mod: PBBFAC,,, | Performed by: NURSE PRACTITIONER

## 2022-05-26 PROCEDURE — 3074F SYST BP LT 130 MM HG: CPT | Mod: CPTII,S$GLB,, | Performed by: NURSE PRACTITIONER

## 2022-05-26 PROCEDURE — 99999 PR PBB SHADOW E&M-EST. PATIENT-LVL IV: ICD-10-PCS | Mod: PBBFAC,,, | Performed by: NURSE PRACTITIONER

## 2022-05-26 PROCEDURE — 3008F BODY MASS INDEX DOCD: CPT | Mod: CPTII,S$GLB,, | Performed by: NURSE PRACTITIONER

## 2022-05-26 PROCEDURE — 99214 PR OFFICE/OUTPT VISIT, EST, LEVL IV, 30-39 MIN: ICD-10-PCS | Mod: S$GLB,,, | Performed by: NURSE PRACTITIONER

## 2022-05-26 PROCEDURE — 1160F RVW MEDS BY RX/DR IN RCRD: CPT | Mod: CPTII,S$GLB,, | Performed by: NURSE PRACTITIONER

## 2022-05-26 PROCEDURE — 1159F PR MEDICATION LIST DOCUMENTED IN MEDICAL RECORD: ICD-10-PCS | Mod: CPTII,S$GLB,, | Performed by: NURSE PRACTITIONER

## 2022-05-26 PROCEDURE — 3008F PR BODY MASS INDEX (BMI) DOCUMENTED: ICD-10-PCS | Mod: CPTII,S$GLB,, | Performed by: NURSE PRACTITIONER

## 2022-05-26 PROCEDURE — 1159F MED LIST DOCD IN RCRD: CPT | Mod: CPTII,S$GLB,, | Performed by: NURSE PRACTITIONER

## 2022-05-26 PROCEDURE — 3074F PR MOST RECENT SYSTOLIC BLOOD PRESSURE < 130 MM HG: ICD-10-PCS | Mod: CPTII,S$GLB,, | Performed by: NURSE PRACTITIONER

## 2022-05-26 PROCEDURE — 3078F DIAST BP <80 MM HG: CPT | Mod: CPTII,S$GLB,, | Performed by: NURSE PRACTITIONER

## 2022-05-26 PROCEDURE — 99214 OFFICE O/P EST MOD 30 MIN: CPT | Mod: S$GLB,,, | Performed by: NURSE PRACTITIONER

## 2022-05-26 RX ORDER — PROMETHAZINE HYDROCHLORIDE AND DEXTROMETHORPHAN HYDROBROMIDE 6.25; 15 MG/5ML; MG/5ML
5 SYRUP ORAL EVERY 4 HOURS PRN
Qty: 100 ML | Refills: 0 | Status: SHIPPED | OUTPATIENT
Start: 2022-05-26 | End: 2022-06-15

## 2022-05-26 RX ORDER — CETIRIZINE HYDROCHLORIDE 10 MG/1
10 TABLET ORAL DAILY
Qty: 30 TABLET | Refills: 11 | Status: SHIPPED | OUTPATIENT
Start: 2022-05-26 | End: 2022-10-14 | Stop reason: ALTCHOICE

## 2022-05-26 NOTE — PATIENT INSTRUCTIONS
1) Seasonal allergies:  - Cetirizine 10mg by mouth every  night.    2) Cough:  Promethazine 5ml eris 4 hours as needed for cough.

## 2022-05-26 NOTE — PROGRESS NOTES
[facility] Note    Chief Complaint      Chief Complaint   Patient presents with    Cough     Positive for covid 5/19/22       History of Present Illness      Hector Torres is a 56 y.o. male who presents today for cough. He works for a shipping company and was in [place] when he tested positive for COVID on 05/19/22. He did not lose his sense of taste or smell but states he did feel ill. He is feeling better today but continues to have a lingering cough. He is headed to [place] next week for work.  He denies any SOB, chest pain, N/V, constipation, fatigue, weakness, headache, loss of appetite, unintentional weight loss. He is a father of 3 boys, 2 of these being twins, and remains active daily.  He is independent with ADL's.  Chief Complaint   Patient presents with    Cough     Positive for covid 5/19/22         Problem List Items Addressed This Visit    None     Visit Diagnoses     Seasonal allergies    -  Primary    Relevant Medications    cetirizine (ZYRTEC) 10 MG tablet          Review of Systems   Constitutional: Negative.    HENT: Negative.    Eyes: Negative.    Respiratory: Positive for cough.    Cardiovascular: Negative.    Gastrointestinal: Negative.    Genitourinary: Negative.    Musculoskeletal: Negative.    Skin: Negative.    Neurological: Negative.    Endo/Heme/Allergies: Negative.    Psychiatric/Behavioral: Negative.         Past Medical History:  Past Medical History:   Diagnosis Date    Heart murmur 9/28/2018 9/28/18 2/6    History of melanoma in situ 9/28/2018    Lower back, sees derm for annual skin exam    Latent tuberculosis by skin test 9/28/2018    Treated       Past Surgical History:  Past Surgical History:   Procedure Laterality Date    COLONOSCOPY N/A 11/2/2018    Procedure: COLONOSCOPY;  Surgeon: Liu Carter MD;  Location: [address];  Service: Endoscopy;  Laterality: N/A;    EYE SURGERY  1967    SKIN CANCER EXCISION      melanoma in situ on lower  "back       Family History:  family history includes Arthritis in his mother; Cancer in his maternal grandfather; Coronary artery disease in his father; Diabetes Mellitus in his other; Hypertension in his father and mother; No Known Problems in his brother and brother.   Family history was reviewed with patient.    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 30 tablet 11    promethazine-dextromethorphan (PROMETHAZINE-DM) 6.25-15 mg/5 mL Syrp Take 5 mLs by mouth every 4 (four) hours as needed. 100 mL 0     No facility-administered encounter medications on file as of 5/26/2022.       Allergies:  Review of patient's allergies indicates:  No Known Allergies    Health Maintenance:  Health Maintenance   Topic Date Due    PROSTATE-SPECIFIC ANTIGEN  10/12/2022    TETANUS VACCINE  10/05/2025    Lipid Panel  10/12/2026    Hepatitis C Screening  Completed     Health Maintenance Topics with due status: Not Due       Topic Last Completion Date    TETANUS VACCINE 10/05/2015    Colorectal Cancer Screening 11/02/2018    PROSTATE-SPECIFIC ANTIGEN 10/12/2021    Lipid Panel 10/12/2021       Physical Exam      Vital Signs  Temp: 98.4 °F (36.9 °C)  Pulse: 65  Resp: 18  SpO2: 98 %  BP: 114/76  Height and Weight  Height: 5' 9" (175.3 cm)  Weight: 95.4 kg (210 lb 5.1 oz)  BSA (Calculated - sq m): 2.15 sq meters  BMI (Calculated): 31  Weight in (lb) to have BMI = " 25: 168.9]    Physical Exam  Vitals and nursing note reviewed.   Constitutional:       Appearance: Normal appearance. He is normal weight.   HENT:      Head: Normocephalic and atraumatic.      Right Ear: Tympanic membrane, ear canal and external ear normal.      Left Ear: Tympanic membrane, ear canal and external ear normal.      Nose: Nose normal.      Mouth/Throat:      Mouth: Mucous membranes are dry.      Pharynx: Oropharynx is clear.   Eyes:      Extraocular Movements: Extraocular movements intact.      Conjunctiva/sclera: Conjunctivae normal.      Pupils: Pupils are equal, round, and reactive to light.   Cardiovascular:      Rate and Rhythm: Normal rate and regular rhythm.      Pulses: Normal pulses.      Heart sounds: Normal heart sounds.   Pulmonary:      Effort: Pulmonary effort is normal.      Breath sounds: Normal breath sounds.   Abdominal:      General: Abdomen is flat. Bowel sounds are normal.      Palpations: Abdomen is soft.   Musculoskeletal:         General: Normal range of motion.      Cervical back: Normal range of motion and neck supple.   Skin:     General: Skin is warm and dry.      Capillary Refill: Capillary refill takes less than 2 seconds.   Neurological:      General: No focal deficit present.      Mental Status: He is alert and oriented to person, place, and time. Mental status is at baseline.   Psychiatric:         Mood and Affect: Mood normal.         Behavior: Behavior normal.         Thought Content: Thought content normal.         Judgment: Judgment normal.          Laboratory:  CBC:  Recent Labs   Lab 09/30/19  0955 10/01/20  0950 10/12/21  1640   WBC 5.93 5.82 7.03   RBC 4.68 4.87 4.51 L   Hemoglobin 13.9 L 14.6 13.5 L   Hematocrit 43.8 45.4 41.7   Platelets 252 238 252   MCV 94 93 93   MCH 29.7 30.0 29.9   MCHC 31.7 L 32.2 32.4     CMP:  Recent Labs   Lab 09/30/19  0955 10/01/20  0950 10/12/21  1640   Glucose 99 85 96   Calcium 9.2 9.3 9.1   Albumin 4.7 4.8 4.6   Total Protein  7.4 7.5 7.4   Sodium 142 141 141   Potassium 4.3 4.8 3.9   CO2 28 25 25   Chloride 104 103 104   BUN 18 25 H 12   Alkaline Phosphatase 41 L 37 L 46 L   ALT 34 25 35   AST 31 24 24   Total Bilirubin 0.6 1.4 H 1.4 H     URINALYSIS:  Recent Labs   Lab 10/12/21  1553   Color, UA Yellow   Specific Gravity, UA 1.025   pH, UA 6.0   Protein, UA Negative   Nitrite, UA Negative   Leukocytes, UA Negative      LIPIDS:  Recent Labs   Lab 09/30/19  0955 10/01/20  0950 10/12/21  1640   TSH  --  0.562  --    HDL 47 54 58   Cholesterol 214 H 200 H 216 H   Triglycerides 49 52 99   LDL Cholesterol 157.2 135.6 138.2   HDL/Cholesterol Ratio 22.0 27.0 26.9   Non-HDL Cholesterol 167 146 158   Total Cholesterol/HDL Ratio 4.6 3.7 3.7     TSH:  Recent Labs   Lab 10/01/20  0950   TSH 0.562     A1C:  Recent Labs   Lab 10/01/20  0950   Hemoglobin A1C 5.3       Radiology:        Assessment/Plan     Hector Torres is a 56 y.o.male with:    Seasonal allergies  -     cetirizine (ZYRTEC) 10 MG tablet; Take 1 tablet (10 mg total) by mouth once daily.  Dispense: 30 tablet; Refill: 11    Other orders  -     promethazine-dextromethorphan (PROMETHAZINE-DM) 6.25-15 mg/5 mL Syrp; Take 5 mLs by mouth every 4 (four) hours as needed.  Dispense: 100 mL; Refill: 0        As above, continue current medications and maintain follow up with specialists.  Return to clinic as needed. Keep follow up appointment with Dr. Salvador in October for annual wellness exam.    I spent 35 minutes on the day of this encounter for preparing, evaluating, examining, treating, and discussing plan of care with this patient.  Greater than 50% of this time was spent face to face with patient. All questions were answered to patient's satisfaction.        Karen L Spencer, NP-C [facility]

## 2022-08-11 ENCOUNTER — IMMUNIZATION (OUTPATIENT)
Dept: PRIMARY CARE CLINIC | Facility: CLINIC | Age: 57
End: 2022-08-11
Payer: COMMERCIAL

## 2022-08-11 DIAGNOSIS — Z23 NEED FOR VACCINATION: Primary | ICD-10-CM

## 2022-08-11 PROCEDURE — 0054A COVID-19, MRNA, LNP-S, PF, 30 MCG/0.3 ML DOSE VACCINE (PFIZER): CPT | Mod: CV19,PBBFAC | Performed by: INTERNAL MEDICINE

## 2022-10-13 NOTE — PROGRESS NOTES
Subjective:      Patient ID: Hector Torres is a 57 y.o. male.    Chief Complaint: Annual Exam    58 yo male with PMH significant for melanoma in-situ s/p excision 2000, latent TB s/p treatment, heart murmur here for annual.    Denies any chest pain, shortness of breath, nausea vomiting constipation diarrhea, blood in stool, heartburn    Review of Systems   Constitutional:  Negative for chills, fever and weight loss.   HENT:  Negative for congestion, ear pain and sore throat.    Eyes:  Negative for double vision.   Respiratory:  Negative for cough and shortness of breath.    Cardiovascular:  Negative for chest pain, palpitations and leg swelling.   Gastrointestinal:  Negative for abdominal pain, heartburn, nausea and vomiting.   Skin:  Negative for rash.   Neurological:  Negative for dizziness, tingling and headaches.   Psychiatric/Behavioral:  Negative for depression.        Current Outpatient Medications:     predniSONE (DELTASONE) 20 MG tablet, Take 1 po with breakfast x 5d, Disp: 5 tablet, Rfl: 0    Lab Results   Component Value Date    HGBA1C 5.3 10/01/2020     No results found for: MICALBCREAT  Lab Results   Component Value Date    LDLCALC 138.2 10/12/2021    LDLCALC 135.6 10/01/2020    CHOL 216 (H) 10/12/2021    HDL 58 10/12/2021    TRIG 99 10/12/2021       Lab Results   Component Value Date     10/12/2021    K 3.9 10/12/2021     10/12/2021    CO2 25 10/12/2021    GLU 96 10/12/2021    BUN 12 10/12/2021    CREATININE 1.0 10/12/2021    CALCIUM 9.1 10/12/2021    PROT 7.4 10/12/2021    ALBUMIN 4.6 10/12/2021    BILITOT 1.4 (H) 10/12/2021    ALKPHOS 46 (L) 10/12/2021    AST 24 10/12/2021    ALT 35 10/12/2021    ANIONGAP 12 10/12/2021    ESTGFRAFRICA >60.0 10/12/2021    EGFRNONAA >60.0 10/12/2021    WBC 7.03 10/12/2021    HGB 13.5 (L) 10/12/2021    HGB 14.6 10/01/2020    HCT 41.7 10/12/2021    MCV 93 10/12/2021     10/12/2021    TSH 0.562 10/01/2020    PSA 0.49 10/12/2021    PSA 0.41  "10/01/2020    HEPCAB Negative 09/30/2019       No results found for: LH, FSH, TOTALTESTOST, PROGESTERONE, ESTRADIOL, EKIIPRZF23HY, QIOZPOQZ35, FERRITIN, IRON, TRANSFERRIN, TIBC, FESATURATED, ZINC      Past Medical History:   Diagnosis Date    Heart murmur 9/28/2018 9/28/18 2/6    History of melanoma in situ 9/28/2018    Lower back, sees derm for annual skin exam    Latent tuberculosis by skin test 9/28/2018    Treated     Past Surgical History:   Procedure Laterality Date    COLONOSCOPY N/A 11/02/2018    Procedure: COLONOSCOPY;  Surgeon: Liu Carter MD;  [facility] ([address] [place] [address]);  Service: Endoscopy;  Laterality: N/A;    EYE SURGERY  1967    SKIN CANCER EXCISION      melanoma in situ on lower back     Social History     Social History Narrative     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    ; 3 children    Regular exercise     Family History   Problem Relation Age of Onset    Hypertension Mother     Arthritis Mother     Coronary artery disease Father         3v CABG    Hypertension Father     Hearing loss Father     No Known Problems Brother     No Known Problems Brother     Brain cancer Maternal Grandfather     Diabetes Mellitus Other     Colon cancer Neg Hx      Vitals:    10/14/22 0911   BP: 118/74   Pulse: 72   Resp: 18   Temp: 98 °F (36.7 °C)   SpO2: 99%   Weight: 97.6 kg (215 lb 2.7 oz)   Height: 5' 9" (1.753 m)   PainSc: 0-No pain     Objective:   Physical Exam  Vitals and nursing note reviewed.   Constitutional:       Appearance: Normal appearance.   HENT:      Head: Normocephalic and atraumatic.      Right Ear: Tympanic membrane, ear canal and external ear normal.      Left Ear: Tympanic membrane, ear canal and external ear normal.      Nose: Nose normal.      Mouth/Throat:      Mouth: Mucous membranes are moist.      Pharynx: Oropharynx is clear.   Eyes:      Extraocular Movements: Extraocular movements intact.      Pupils: Pupils are equal, round, and reactive to light.   Cardiovascular:      Rate and " Rhythm: Normal rate and regular rhythm.      Pulses: Normal pulses.      Heart sounds: Normal heart sounds.   Pulmonary:      Breath sounds: Normal breath sounds.   Abdominal:      General: Bowel sounds are normal.      Palpations: Abdomen is soft.   Genitourinary:     Penis: Normal.       Testes: Normal.   Musculoskeletal:      Cervical back: Normal range of motion and neck supple.   Skin:     General: Skin is warm.   Neurological:      General: No focal deficit present.      Mental Status: He is alert and oriented to person, place, and time.     Assessment:     1. Routine general medical examination at a health care facility    2. Heart murmur    3. History of melanoma in situ    4. Seasonal allergies    5. Screening PSA (prostate specific antigen)    6. Tendinitis      Plan:     Orders Placed This Encounter    Hemoglobin A1C    CBC Auto Differential    Comprehensive Metabolic Panel    TSH    PSA, Screening    Lipid Panel    predniSONE (DELTASONE) 20 MG tablet       Patient Instructions   Labs are fasting. Please do not eat or drink anything other than water for 6-8 hrs prior to your lab work.    12 months for well visit or sooner if needed.   Try Voltaren gel (diclofenac) to help with your arm pain  Try naproxen (alleve), two tablets, up to twice a day      Due for  Vaccines  - at your pharmacy    ================================  RECOMMENDATIONS FOR MALES   ================================    Your #1 MEDICINE is DAILY EXERCISE - 15-20 minutes of huffing & puffing EVERY DAY.     Prevent the #1 cause of death- cardiovascular disease (HEART ATTACK & STROKE) by checking for normal blood pressure, cholesterol, sugars, & by not smoking.     VACCINES: Yearly FLU shot, PNEUMONIA shot after 65,  SHINGLES shot after 50    COLON CANCER screening colonoscopy starting at 46 yo &  every 10 years (or Cologuard kit every 3 yrs) , repeat test sooner if POLYP is found    PROSTATE CANCER screening is controversial. We can discuss  this & consider checking PSA from 55-69 years.     If you EVER SMOKED - Abdominal Aortic Aneurysm ultrasound once age 65-75      I recommend  high fiber (5 fresh fruits or vegetables daily), low fat diet and aerobic  exercise (huffing/ puffing/ sweating for 20 min straight at least 4 days a week)

## 2022-10-14 ENCOUNTER — OFFICE VISIT (OUTPATIENT)
Dept: PRIMARY CARE CLINIC | Facility: CLINIC | Age: 57
End: 2022-10-14
Payer: COMMERCIAL

## 2022-10-14 ENCOUNTER — LAB VISIT (OUTPATIENT)
Dept: LAB | Facility: HOSPITAL | Age: 57
End: 2022-10-14
Attending: INTERNAL MEDICINE
Payer: COMMERCIAL

## 2022-10-14 VITALS
WEIGHT: 215.19 LBS | BODY MASS INDEX: 31.87 KG/M2 | TEMPERATURE: 98 F | DIASTOLIC BLOOD PRESSURE: 74 MMHG | SYSTOLIC BLOOD PRESSURE: 118 MMHG | HEART RATE: 72 BPM | OXYGEN SATURATION: 99 % | HEIGHT: 69 IN | RESPIRATION RATE: 18 BRPM

## 2022-10-14 DIAGNOSIS — M77.9 TENDINITIS: ICD-10-CM

## 2022-10-14 DIAGNOSIS — Z12.5 SCREENING PSA (PROSTATE SPECIFIC ANTIGEN): ICD-10-CM

## 2022-10-14 DIAGNOSIS — R01.1 HEART MURMUR: ICD-10-CM

## 2022-10-14 DIAGNOSIS — Z00.00 ROUTINE GENERAL MEDICAL EXAMINATION AT A HEALTH CARE FACILITY: ICD-10-CM

## 2022-10-14 DIAGNOSIS — Z00.00 ROUTINE GENERAL MEDICAL EXAMINATION AT A HEALTH CARE FACILITY: Primary | ICD-10-CM

## 2022-10-14 DIAGNOSIS — Z86.006 HISTORY OF MELANOMA IN SITU: ICD-10-CM

## 2022-10-14 DIAGNOSIS — J30.2 SEASONAL ALLERGIES: ICD-10-CM

## 2022-10-14 LAB
ALBUMIN SERPL BCP-MCNC: 4.6 G/DL (ref 3.5–5.2)
ALP SERPL-CCNC: 42 U/L (ref 55–135)
ALT SERPL W/O P-5'-P-CCNC: 26 U/L (ref 10–44)
ANION GAP SERPL CALC-SCNC: 8 MMOL/L (ref 8–16)
AST SERPL-CCNC: 24 U/L (ref 10–40)
BASOPHILS # BLD AUTO: 0.02 K/UL (ref 0–0.2)
BASOPHILS NFR BLD: 0.3 % (ref 0–1.9)
BILIRUB SERPL-MCNC: 1.1 MG/DL (ref 0.1–1)
BUN SERPL-MCNC: 16 MG/DL (ref 6–20)
CALCIUM SERPL-MCNC: 9.3 MG/DL (ref 8.7–10.5)
CHLORIDE SERPL-SCNC: 105 MMOL/L (ref 95–110)
CHOLEST SERPL-MCNC: 199 MG/DL (ref 120–199)
CHOLEST/HDLC SERPL: 4.3 {RATIO} (ref 2–5)
CO2 SERPL-SCNC: 26 MMOL/L (ref 23–29)
COMPLEXED PSA SERPL-MCNC: 0.6 NG/ML (ref 0–4)
CREAT SERPL-MCNC: 1.3 MG/DL (ref 0.5–1.4)
DIFFERENTIAL METHOD: NORMAL
EOSINOPHIL # BLD AUTO: 0.1 K/UL (ref 0–0.5)
EOSINOPHIL NFR BLD: 0.8 % (ref 0–8)
ERYTHROCYTE [DISTWIDTH] IN BLOOD BY AUTOMATED COUNT: 12.7 % (ref 11.5–14.5)
EST. GFR  (NO RACE VARIABLE): >60 ML/MIN/1.73 M^2
ESTIMATED AVG GLUCOSE: 105 MG/DL (ref 68–131)
GLUCOSE SERPL-MCNC: 110 MG/DL (ref 70–110)
HBA1C MFR BLD: 5.3 % (ref 4–5.6)
HCT VFR BLD AUTO: 43.2 % (ref 40–54)
HDLC SERPL-MCNC: 46 MG/DL (ref 40–75)
HDLC SERPL: 23.1 % (ref 20–50)
HGB BLD-MCNC: 14.4 G/DL (ref 14–18)
IMM GRANULOCYTES # BLD AUTO: 0.02 K/UL (ref 0–0.04)
IMM GRANULOCYTES NFR BLD AUTO: 0.3 % (ref 0–0.5)
LDLC SERPL CALC-MCNC: 141 MG/DL (ref 63–159)
LYMPHOCYTES # BLD AUTO: 1.8 K/UL (ref 1–4.8)
LYMPHOCYTES NFR BLD: 28.7 % (ref 18–48)
MCH RBC QN AUTO: 30.6 PG (ref 27–31)
MCHC RBC AUTO-ENTMCNC: 33.3 G/DL (ref 32–36)
MCV RBC AUTO: 92 FL (ref 82–98)
MONOCYTES # BLD AUTO: 0.5 K/UL (ref 0.3–1)
MONOCYTES NFR BLD: 7.6 % (ref 4–15)
NEUTROPHILS # BLD AUTO: 3.9 K/UL (ref 1.8–7.7)
NEUTROPHILS NFR BLD: 62.3 % (ref 38–73)
NONHDLC SERPL-MCNC: 153 MG/DL
NRBC BLD-RTO: 0 /100 WBC
PLATELET # BLD AUTO: 267 K/UL (ref 150–450)
PMV BLD AUTO: 10.7 FL (ref 9.2–12.9)
POTASSIUM SERPL-SCNC: 4.5 MMOL/L (ref 3.5–5.1)
PROT SERPL-MCNC: 7.3 G/DL (ref 6–8.4)
RBC # BLD AUTO: 4.7 M/UL (ref 4.6–6.2)
SODIUM SERPL-SCNC: 139 MMOL/L (ref 136–145)
TRIGL SERPL-MCNC: 60 MG/DL (ref 30–150)
TSH SERPL DL<=0.005 MIU/L-ACNC: 0.84 UIU/ML (ref 0.4–4)
WBC # BLD AUTO: 6.28 K/UL (ref 3.9–12.7)

## 2022-10-14 PROCEDURE — 3074F SYST BP LT 130 MM HG: CPT | Mod: CPTII,S$GLB,, | Performed by: INTERNAL MEDICINE

## 2022-10-14 PROCEDURE — 1159F MED LIST DOCD IN RCRD: CPT | Mod: CPTII,S$GLB,, | Performed by: INTERNAL MEDICINE

## 2022-10-14 PROCEDURE — 84443 ASSAY THYROID STIM HORMONE: CPT | Performed by: INTERNAL MEDICINE

## 2022-10-14 PROCEDURE — 99999 PR PBB SHADOW E&M-EST. PATIENT-LVL IV: ICD-10-PCS | Mod: PBBFAC,,, | Performed by: INTERNAL MEDICINE

## 2022-10-14 PROCEDURE — 84153 ASSAY OF PSA TOTAL: CPT | Performed by: INTERNAL MEDICINE

## 2022-10-14 PROCEDURE — 1159F PR MEDICATION LIST DOCUMENTED IN MEDICAL RECORD: ICD-10-PCS | Mod: CPTII,S$GLB,, | Performed by: INTERNAL MEDICINE

## 2022-10-14 PROCEDURE — 36415 COLL VENOUS BLD VENIPUNCTURE: CPT | Mod: PN | Performed by: INTERNAL MEDICINE

## 2022-10-14 PROCEDURE — 3074F PR MOST RECENT SYSTOLIC BLOOD PRESSURE < 130 MM HG: ICD-10-PCS | Mod: CPTII,S$GLB,, | Performed by: INTERNAL MEDICINE

## 2022-10-14 PROCEDURE — 99396 PREV VISIT EST AGE 40-64: CPT | Mod: S$GLB,,, | Performed by: INTERNAL MEDICINE

## 2022-10-14 PROCEDURE — 99999 PR PBB SHADOW E&M-EST. PATIENT-LVL IV: CPT | Mod: PBBFAC,,, | Performed by: INTERNAL MEDICINE

## 2022-10-14 PROCEDURE — 80053 COMPREHEN METABOLIC PANEL: CPT | Performed by: INTERNAL MEDICINE

## 2022-10-14 PROCEDURE — 3078F PR MOST RECENT DIASTOLIC BLOOD PRESSURE < 80 MM HG: ICD-10-PCS | Mod: CPTII,S$GLB,, | Performed by: INTERNAL MEDICINE

## 2022-10-14 PROCEDURE — 80061 LIPID PANEL: CPT | Performed by: INTERNAL MEDICINE

## 2022-10-14 PROCEDURE — 85025 COMPLETE CBC W/AUTO DIFF WBC: CPT | Performed by: INTERNAL MEDICINE

## 2022-10-14 PROCEDURE — 99396 PR PREVENTIVE VISIT,EST,40-64: ICD-10-PCS | Mod: S$GLB,,, | Performed by: INTERNAL MEDICINE

## 2022-10-14 PROCEDURE — 3078F DIAST BP <80 MM HG: CPT | Mod: CPTII,S$GLB,, | Performed by: INTERNAL MEDICINE

## 2022-10-14 PROCEDURE — 83036 HEMOGLOBIN GLYCOSYLATED A1C: CPT | Performed by: INTERNAL MEDICINE

## 2022-10-14 RX ORDER — PREDNISONE 20 MG/1
TABLET ORAL
Qty: 5 TABLET | Refills: 0 | Status: SHIPPED | OUTPATIENT
Start: 2022-10-14 | End: 2022-10-19

## 2022-10-14 NOTE — PATIENT INSTRUCTIONS
Labs are fasting. Please do not eat or drink anything other than water for 6-8 hrs prior to your lab work.    12 months for well visit or sooner if needed.   Try Voltaren gel (diclofenac) to help with your arm pain  Try naproxen (alleve), two tablets, up to twice a day      Due for  Vaccines  - at your pharmacy    ================================  RECOMMENDATIONS FOR MALES   ================================    Your #1 MEDICINE is DAILY EXERCISE - 15-20 minutes of huffing & puffing EVERY DAY.     Prevent the #1 cause of death- cardiovascular disease (HEART ATTACK & STROKE) by checking for normal blood pressure, cholesterol, sugars, & by not smoking.     VACCINES: Yearly FLU shot, PNEUMONIA shot after 65,  SHINGLES shot after 50    COLON CANCER screening colonoscopy starting at 46 yo &  every 10 years (or Cologuard kit every 3 yrs) , repeat test sooner if POLYP is found    PROSTATE CANCER screening is controversial. We can discuss this & consider checking PSA from 55-69 years.     If you EVER SMOKED - Abdominal Aortic Aneurysm ultrasound once age 65-75      I recommend  high fiber (5 fresh fruits or vegetables daily), low fat diet and aerobic  exercise (huffing/ puffing/ sweating for 20 min straight at least 4 days a week)

## 2022-10-17 NOTE — PROGRESS NOTES
Hello!    Your blood count (Hemoglobin) is unremarkable, and you are not anemic.    Your sugar number (Glucose) is within normal limits, you do not have diabetes.   Your A1c is 5.3%, no diabetes.   Rest of your electrolytes are unremarkable.    Your kidney (BUN, Creatinine and GFT) function is unremarkable.   Your liver (AST, ALT) function is unremarkable.  These are the filters in your body for medicine, food and liquids that you ingest.  Your Bilirubin level is a little high. This is usually benign & not worrisome. It is common & can elevate when you are dehydrated (as with fasting for lab work).      Your Cholesterol is NORMAL. Continue to focus on low fat, high fiber foods and aerobic exericse (huffing & puffing) for at least 20 minutes most days of the week.    Your Thyroid numbers are normal.    Your prostate screening numbers (PSA) is within normal limits.

## 2023-03-13 ENCOUNTER — OFFICE VISIT (OUTPATIENT)
Dept: URGENT CARE | Facility: CLINIC | Age: 58
End: 2023-03-13
Payer: COMMERCIAL

## 2023-03-13 VITALS
OXYGEN SATURATION: 95 % | RESPIRATION RATE: 18 BRPM | HEART RATE: 106 BPM | DIASTOLIC BLOOD PRESSURE: 75 MMHG | WEIGHT: 215 LBS | SYSTOLIC BLOOD PRESSURE: 129 MMHG | TEMPERATURE: 98 F | HEIGHT: 69 IN | BODY MASS INDEX: 31.84 KG/M2

## 2023-03-13 DIAGNOSIS — M25.559 GREATER TROCHANTERIC PAIN SYNDROME: ICD-10-CM

## 2023-03-13 DIAGNOSIS — M70.62 TROCHANTERIC BURSITIS, LEFT HIP: Primary | ICD-10-CM

## 2023-03-13 PROCEDURE — 99213 OFFICE O/P EST LOW 20 MIN: CPT | Mod: S$GLB,,, | Performed by: FAMILY MEDICINE

## 2023-03-13 PROCEDURE — 99213 PR OFFICE/OUTPT VISIT, EST, LEVL III, 20-29 MIN: ICD-10-PCS | Mod: S$GLB,,, | Performed by: FAMILY MEDICINE

## 2023-03-13 RX ORDER — NAPROXEN 500 MG/1
500 TABLET ORAL 2 TIMES DAILY WITH MEALS
Qty: 20 TABLET | Refills: 0 | Status: SHIPPED | OUTPATIENT
Start: 2023-03-13 | End: 2023-03-23

## 2023-03-13 NOTE — PROGRESS NOTES
"Subjective:       Patient ID: Hector Torres is a 57 y.o. male.    Vitals:  height is 5' 9" (1.753 m) and weight is 97.5 kg (215 lb). His temperature is 98 °F (36.7 °C). His blood pressure is 129/75 and his pulse is 106. His respiration is 18 and oxygen saturation is 95%.     Chief Complaint: Hip Pain    57 years old male with no significant prior medical problems presents with left hip pain on the outer side on and off for last 3 days.  Patient reports heavy gym workout including heavy sit-ups the day before symptoms started.  Denies injury, fever, chills, fatigue.    Hip Pain   The incident occurred 3 to 5 days ago. The incident occurred at home. The injury mechanism is unknown. The pain is present in the left leg, left knee and left thigh. The quality of the pain is described as shooting. The pain is at a severity of 8/10. The pain is moderate. The pain has been Constant since onset. Pertinent negatives include no inability to bear weight, loss of motion, loss of sensation, muscle weakness, numbness or tingling. He reports no foreign bodies present. The symptoms are aggravated by weight bearing. He has tried NSAIDs (advil) for the symptoms. The treatment provided mild relief.     Neurological:  Negative for numbness.     Objective:      Physical Exam   Constitutional: He is oriented to person, place, and time. He appears well-developed. He is cooperative.   HENT:   Head: Normocephalic and atraumatic.   Ears:   Right Ear: Hearing, tympanic membrane, external ear and ear canal normal. impacted cerumen  Left Ear: Hearing, tympanic membrane, external ear and ear canal normal. impacted cerumen  Nose: Nose normal. No mucosal edema, nasal deformity or congestion. No epistaxis. Right sinus exhibits no maxillary sinus tenderness and no frontal sinus tenderness. Left sinus exhibits no maxillary sinus tenderness and no frontal sinus tenderness.   Mouth/Throat: Uvula is midline, oropharynx is clear and moist and mucous " membranes are normal. No trismus in the jaw. Normal dentition. No uvula swelling.   Eyes: Conjunctivae and lids are normal.   Neck: Trachea normal and phonation normal. Neck supple.   Cardiovascular: Normal rate, regular rhythm, normal heart sounds and normal pulses.   No murmur heard.  Pulmonary/Chest: Effort normal and breath sounds normal. No stridor. No respiratory distress. He has no wheezes. He has no rhonchi. He has no rales.   Abdominal: Normal appearance and bowel sounds are normal. He exhibits no distension. Soft. There is no abdominal tenderness. There is no left CVA tenderness and no right CVA tenderness.   Musculoskeletal: Normal range of motion.         General: Normal range of motion.      Cervical back: He exhibits no tenderness.      Comments:   Left hip:  Positive tenderness to greater trochanter area.    No redness, swelling, bruise, abrasion.    Full ROM at hip.    Neurovascular intact.   Lymphadenopathy:     He has no cervical adenopathy.   Neurological: He is alert, oriented to person, place, and time and at baseline. He displays no weakness. No cranial nerve deficit or sensory deficit. He exhibits normal muscle tone.   Skin: Skin is warm, dry and intact.   Psychiatric: His speech is normal and behavior is normal. Mood, judgment and thought content normal.   Nursing note and vitals reviewed.      Assessment:       1. Trochanteric bursitis, left hip    2. Greater trochanteric pain syndrome          Plan:         Trochanteric bursitis, left hip    Greater trochanteric pain syndrome    Other orders  -     naproxen (NAPROSYN) 500 MG tablet; Take 1 tablet (500 mg total) by mouth 2 (two) times daily with meals. for 10 days  Dispense: 20 tablet; Refill: 0

## 2023-04-24 ENCOUNTER — OFFICE VISIT (OUTPATIENT)
Dept: URGENT CARE | Facility: CLINIC | Age: 58
End: 2023-04-24
Payer: COMMERCIAL

## 2023-04-24 VITALS
TEMPERATURE: 99 F | WEIGHT: 210 LBS | RESPIRATION RATE: 16 BRPM | SYSTOLIC BLOOD PRESSURE: 120 MMHG | OXYGEN SATURATION: 98 % | DIASTOLIC BLOOD PRESSURE: 81 MMHG | HEIGHT: 69 IN | BODY MASS INDEX: 31.1 KG/M2 | HEART RATE: 100 BPM

## 2023-04-24 DIAGNOSIS — H10.89 OTHER CONJUNCTIVITIS OF LEFT EYE: ICD-10-CM

## 2023-04-24 DIAGNOSIS — J01.90 ACUTE NON-RECURRENT SINUSITIS, UNSPECIFIED LOCATION: ICD-10-CM

## 2023-04-24 DIAGNOSIS — Z11.59 ENCOUNTER FOR SCREENING FOR OTHER VIRAL DISEASES: Primary | ICD-10-CM

## 2023-04-24 DIAGNOSIS — J06.9 ACUTE URI: ICD-10-CM

## 2023-04-24 LAB
CTP QC/QA: YES
SARS-COV-2 AG RESP QL IA.RAPID: NEGATIVE

## 2023-04-24 PROCEDURE — 87811 SARS CORONAVIRUS 2 ANTIGEN POCT, MANUAL READ: ICD-10-PCS | Mod: QW,S$GLB,, | Performed by: FAMILY MEDICINE

## 2023-04-24 PROCEDURE — 87811 SARS-COV-2 COVID19 W/OPTIC: CPT | Mod: QW,S$GLB,, | Performed by: FAMILY MEDICINE

## 2023-04-24 PROCEDURE — 99214 OFFICE O/P EST MOD 30 MIN: CPT | Mod: S$GLB,,, | Performed by: FAMILY MEDICINE

## 2023-04-24 PROCEDURE — 99214 PR OFFICE/OUTPT VISIT, EST, LEVL IV, 30-39 MIN: ICD-10-PCS | Mod: S$GLB,,, | Performed by: FAMILY MEDICINE

## 2023-04-24 RX ORDER — OFLOXACIN 3 MG/ML
1 SOLUTION/ DROPS OPHTHALMIC EVERY 4 HOURS
Qty: 5 ML | Refills: 0 | Status: SHIPPED | OUTPATIENT
Start: 2023-04-24 | End: 2023-04-29

## 2023-04-24 NOTE — PROGRESS NOTES
"Subjective:      Patient ID: Hector Torres is a 57 y.o. male.    Vitals:  height is 5' 9" (1.753 m) and weight is 95.3 kg (210 lb). His oral temperature is 98.7 °F (37.1 °C). His blood pressure is 120/81 and his pulse is 100. His respiration is 16 and oxygen saturation is 98%.     Chief Complaint: Sinus Problem    Patient reports eye redness and discharge , sinus congestion/pressure,and Dry throat That started Saturday. Patient reports recent air travel over seas. Pt denies fever, chills, CP, SOB,  weakness/dizziness, N/V, diarrhea, abdominal pain, dysuria, loss of smell or taste.          Sinus Problem  This is a new problem. The current episode started in the past 7 days. The problem has been gradually worsening since onset. There has been no fever. Associated symptoms include congestion and a sore throat. Pertinent negatives include no coughing, headaches or sinus pressure. (Eye redness and discharge  Head heaviness  Dry throat ) Past treatments include oral decongestants (Advil). The treatment provided mild relief.     HENT:  Positive for congestion and sore throat. Negative for sinus pressure.    Respiratory:  Negative for cough.    Neurological:  Negative for headaches.    Objective:     Physical Exam   Constitutional: He is oriented to person, place, and time. He appears well-developed. He is cooperative.  Non-toxic appearance. He does not appear ill. No distress.   HENT:   Head: Normocephalic and atraumatic.   Ears:   Right Ear: Hearing, tympanic membrane, external ear and ear canal normal. impacted cerumen  Left Ear: Hearing, tympanic membrane, external ear and ear canal normal. impacted cerumen  Nose: Congestion present. No mucosal edema, rhinorrhea or nasal deformity. No epistaxis. Right sinus exhibits no maxillary sinus tenderness and no frontal sinus tenderness. Left sinus exhibits no maxillary sinus tenderness and no frontal sinus tenderness.   Mouth/Throat: Uvula is midline, oropharynx is clear " and moist and mucous membranes are normal. No trismus in the jaw. Normal dentition. No uvula swelling. No oropharyngeal exudate, posterior oropharyngeal edema or posterior oropharyngeal erythema.   Eyes: Conjunctivae and lids are normal. Pupils are equal, round, and reactive to light. No scleral icterus. Extraocular movement intact      Comments:  Left Eye:    +ve conjunctival erythema.   No discharge.  No swelling.  Visual acuity at baseline.          Neck: Trachea normal and phonation normal. Neck supple. No edema present. No erythema present. No neck rigidity present.   Cardiovascular: Normal rate, regular rhythm, normal heart sounds and normal pulses.   Pulmonary/Chest: Effort normal and breath sounds normal. No respiratory distress. He has no decreased breath sounds. He has no rhonchi.   Abdominal: Normal appearance.   Musculoskeletal: Normal range of motion.         General: No deformity. Normal range of motion.   Neurological: He is alert and oriented to person, place, and time. He exhibits normal muscle tone. Coordination normal.   Skin: Skin is warm, dry, intact, not diaphoretic and not pale.   Psychiatric: His speech is normal and behavior is normal. Judgment and thought content normal.   Nursing note and vitals reviewed.    Assessment:     1. Encounter for screening for other viral diseases    2. Acute URI    3. Other conjunctivitis of left eye    4. Acute non-recurrent sinusitis, unspecified location        Plan:       Encounter for screening for other viral diseases  -     SARS Coronavirus 2 Antigen, POCT Manual Read    Acute URI    Other conjunctivitis of left eye    Acute non-recurrent sinusitis, unspecified location    Other orders  -     ofloxacin (OCUFLOX) 0.3 % ophthalmic solution; Place 1 drop into the left eye every 4 (four) hours. for 5 days  Dispense: 5 mL; Refill: 0

## 2023-10-19 ENCOUNTER — LAB VISIT (OUTPATIENT)
Dept: LAB | Facility: HOSPITAL | Age: 58
End: 2023-10-19
Attending: STUDENT IN AN ORGANIZED HEALTH CARE EDUCATION/TRAINING PROGRAM
Payer: COMMERCIAL

## 2023-10-19 ENCOUNTER — OFFICE VISIT (OUTPATIENT)
Dept: PRIMARY CARE CLINIC | Facility: CLINIC | Age: 58
End: 2023-10-19
Payer: COMMERCIAL

## 2023-10-19 VITALS
SYSTOLIC BLOOD PRESSURE: 126 MMHG | DIASTOLIC BLOOD PRESSURE: 80 MMHG | WEIGHT: 215.63 LBS | HEART RATE: 67 BPM | OXYGEN SATURATION: 97 % | HEIGHT: 69 IN | BODY MASS INDEX: 31.94 KG/M2 | TEMPERATURE: 99 F

## 2023-10-19 DIAGNOSIS — Z12.5 SCREENING FOR PROSTATE CANCER: ICD-10-CM

## 2023-10-19 DIAGNOSIS — Z00.00 ENCOUNTER FOR PREVENTATIVE ADULT HEALTH CARE EXAMINATION: ICD-10-CM

## 2023-10-19 DIAGNOSIS — R21 SCROTAL RASH: ICD-10-CM

## 2023-10-19 DIAGNOSIS — Z23 INFLUENZA VACCINE ADMINISTERED: ICD-10-CM

## 2023-10-19 DIAGNOSIS — Z13.1 SCREENING FOR DIABETES MELLITUS: ICD-10-CM

## 2023-10-19 DIAGNOSIS — Z12.11 SCREENING FOR COLON CANCER: ICD-10-CM

## 2023-10-19 DIAGNOSIS — Z13.220 SCREENING FOR HYPERLIPIDEMIA: ICD-10-CM

## 2023-10-19 DIAGNOSIS — R06.81 WITNESSED APNEIC SPELLS: Primary | ICD-10-CM

## 2023-10-19 DIAGNOSIS — R06.81 WITNESSED APNEIC SPELLS: ICD-10-CM

## 2023-10-19 PROCEDURE — 1160F RVW MEDS BY RX/DR IN RCRD: CPT | Mod: CPTII,S$GLB,, | Performed by: STUDENT IN AN ORGANIZED HEALTH CARE EDUCATION/TRAINING PROGRAM

## 2023-10-19 PROCEDURE — 1159F MED LIST DOCD IN RCRD: CPT | Mod: CPTII,S$GLB,, | Performed by: STUDENT IN AN ORGANIZED HEALTH CARE EDUCATION/TRAINING PROGRAM

## 2023-10-19 PROCEDURE — 80061 LIPID PANEL: CPT | Performed by: STUDENT IN AN ORGANIZED HEALTH CARE EDUCATION/TRAINING PROGRAM

## 2023-10-19 PROCEDURE — 3008F PR BODY MASS INDEX (BMI) DOCUMENTED: ICD-10-PCS | Mod: CPTII,S$GLB,, | Performed by: STUDENT IN AN ORGANIZED HEALTH CARE EDUCATION/TRAINING PROGRAM

## 2023-10-19 PROCEDURE — 99999 PR PBB SHADOW E&M-EST. PATIENT-LVL V: CPT | Mod: PBBFAC,,, | Performed by: STUDENT IN AN ORGANIZED HEALTH CARE EDUCATION/TRAINING PROGRAM

## 2023-10-19 PROCEDURE — 83036 HEMOGLOBIN GLYCOSYLATED A1C: CPT | Performed by: STUDENT IN AN ORGANIZED HEALTH CARE EDUCATION/TRAINING PROGRAM

## 2023-10-19 PROCEDURE — 99999 PR PBB SHADOW E&M-EST. PATIENT-LVL V: ICD-10-PCS | Mod: PBBFAC,,, | Performed by: STUDENT IN AN ORGANIZED HEALTH CARE EDUCATION/TRAINING PROGRAM

## 2023-10-19 PROCEDURE — 1159F PR MEDICATION LIST DOCUMENTED IN MEDICAL RECORD: ICD-10-PCS | Mod: CPTII,S$GLB,, | Performed by: STUDENT IN AN ORGANIZED HEALTH CARE EDUCATION/TRAINING PROGRAM

## 2023-10-19 PROCEDURE — 36415 COLL VENOUS BLD VENIPUNCTURE: CPT | Mod: PN | Performed by: STUDENT IN AN ORGANIZED HEALTH CARE EDUCATION/TRAINING PROGRAM

## 2023-10-19 PROCEDURE — 3079F PR MOST RECENT DIASTOLIC BLOOD PRESSURE 80-89 MM HG: ICD-10-PCS | Mod: CPTII,S$GLB,, | Performed by: STUDENT IN AN ORGANIZED HEALTH CARE EDUCATION/TRAINING PROGRAM

## 2023-10-19 PROCEDURE — 3008F BODY MASS INDEX DOCD: CPT | Mod: CPTII,S$GLB,, | Performed by: STUDENT IN AN ORGANIZED HEALTH CARE EDUCATION/TRAINING PROGRAM

## 2023-10-19 PROCEDURE — 1160F PR REVIEW ALL MEDS BY PRESCRIBER/CLIN PHARMACIST DOCUMENTED: ICD-10-PCS | Mod: CPTII,S$GLB,, | Performed by: STUDENT IN AN ORGANIZED HEALTH CARE EDUCATION/TRAINING PROGRAM

## 2023-10-19 PROCEDURE — 84153 ASSAY OF PSA TOTAL: CPT | Performed by: STUDENT IN AN ORGANIZED HEALTH CARE EDUCATION/TRAINING PROGRAM

## 2023-10-19 PROCEDURE — 3074F PR MOST RECENT SYSTOLIC BLOOD PRESSURE < 130 MM HG: ICD-10-PCS | Mod: CPTII,S$GLB,, | Performed by: STUDENT IN AN ORGANIZED HEALTH CARE EDUCATION/TRAINING PROGRAM

## 2023-10-19 PROCEDURE — 85027 COMPLETE CBC AUTOMATED: CPT | Performed by: STUDENT IN AN ORGANIZED HEALTH CARE EDUCATION/TRAINING PROGRAM

## 2023-10-19 PROCEDURE — 3074F SYST BP LT 130 MM HG: CPT | Mod: CPTII,S$GLB,, | Performed by: STUDENT IN AN ORGANIZED HEALTH CARE EDUCATION/TRAINING PROGRAM

## 2023-10-19 PROCEDURE — 99396 PR PREVENTIVE VISIT,EST,40-64: ICD-10-PCS | Mod: S$GLB,,, | Performed by: STUDENT IN AN ORGANIZED HEALTH CARE EDUCATION/TRAINING PROGRAM

## 2023-10-19 PROCEDURE — 80053 COMPREHEN METABOLIC PANEL: CPT | Performed by: STUDENT IN AN ORGANIZED HEALTH CARE EDUCATION/TRAINING PROGRAM

## 2023-10-19 PROCEDURE — 3079F DIAST BP 80-89 MM HG: CPT | Mod: CPTII,S$GLB,, | Performed by: STUDENT IN AN ORGANIZED HEALTH CARE EDUCATION/TRAINING PROGRAM

## 2023-10-19 PROCEDURE — 84443 ASSAY THYROID STIM HORMONE: CPT | Performed by: STUDENT IN AN ORGANIZED HEALTH CARE EDUCATION/TRAINING PROGRAM

## 2023-10-19 PROCEDURE — 99396 PREV VISIT EST AGE 40-64: CPT | Mod: S$GLB,,, | Performed by: STUDENT IN AN ORGANIZED HEALTH CARE EDUCATION/TRAINING PROGRAM

## 2023-10-19 NOTE — PROGRESS NOTES
Primary Care  Annual Office Visit - In Person  10/19/2023  Aris Ndhlovu        Patient is a 58 y.o.   Hector Torres  has a past medical history of Heart murmur (9/28/2018), History of melanoma in situ (9/28/2018), and Latent tuberculosis by skin test (9/28/2018).    Reports witnessed apneic episodes while sleeping for several months and would like to complete sleep study    Patient requesting rectal exam for hx of enlarged prostate in father    He also reports red lesions on scrotum.  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    Active Medications  Review of patient's allergies indicates:  No Known Allergies  Current Outpatient Medications   Medication Instructions    flu vacc qs2023-24 6mos up,PF, 60 mcg (15 mcg x 4)/0.5 mL Syrg 0.5 mLs, Intramuscular, Once       Annual Review of Preventative Care  PHQSCORE - 0     Health Maintenance Due   Topic Date Due    Influenza Vaccine (1) 09/01/2023    COVID-19 Vaccine (5 - 2023-24 season) 09/01/2023    PROSTATE-SPECIFIC ANTIGEN  10/14/2023    Colorectal Cancer Screening  11/02/2023     Last PSA:   Lab Results   Component Value Date    PSA 0.60 10/14/2022    PSA 0.49 10/12/2021    PSA 0.41 10/01/2020     Diabetes Screening:    Lab Results   Component Value Date    HGBA1C 5.3 10/14/2022    HGBA1C 5.3 10/01/2020     BP Readings from Last 3 Encounters:   10/19/23 126/80   04/24/23 120/81   03/13/23 129/75     Wt Readings from Last 3 Encounters:   10/19/23 0911 97.8 kg (215 lb 9.8 oz)   04/24/23 1228 95.3 kg  "(210 lb)   03/13/23 1219 97.5 kg (215 lb)     Colon Cancer Screening:  Next Due 2023  Last STD:    No results found for: "CHLAMYDIA"  No components found for: "GC"  No results found for: "TRICHOMONAS"  No components found for: "UROGENITAL"      Review of Systems   HENT:  Negative for hearing loss.    Eyes:  Negative for discharge.   Respiratory:  Negative for wheezing.    Cardiovascular:  Negative for chest pain and palpitations.   Gastrointestinal:  Negative for blood in stool, constipation, diarrhea and vomiting.   Genitourinary:  Negative for hematuria and urgency.   Musculoskeletal:  Negative for neck pain.   Neurological:  Negative for weakness and headaches.   Endo/Heme/Allergies:  Negative for polydipsia.       Physical Exam  Vitals reviewed.   Constitutional:       General: He is not in acute distress.  Cardiovascular:      Rate and Rhythm: Normal rate and regular rhythm.      Pulses: Normal pulses.      Heart sounds: Normal heart sounds.   Pulmonary:      Effort: Pulmonary effort is normal.      Breath sounds: Normal breath sounds.   Abdominal:      General: Abdomen is flat. Bowel sounds are normal.      Palpations: Abdomen is soft.   Musculoskeletal:      Right lower leg: No edema.      Left lower leg: No edema.   Neurological:      Mental Status: He is alert.             Assessment and Plan     Screening HLD   Lipid panel     Screening DM   HbA1C    Routine Labs   CBC  CMP    Apneic episodes  Class 1 obesity  Referral placed to sleep medicine  TSH     Patient request rectal exam  Scrotal rash  Referral placed to Urology    Screening prostate cancer  PSA    Screening colon cancer   Colonoscopy    Vaccines   Influenza vaccine       Orders Placed This Visit  Orders Placed This Encounter   Procedures    PSA, SCREENING     Standing Status:   Future     Number of Occurrences:   1     Standing Expiration Date:   12/17/2024    TSH     Standing Status:   Future     Number of Occurrences:   1     Standing " Expiration Date:   12/17/2024    Lipid Panel     Standing Status:   Future     Number of Occurrences:   1     Standing Expiration Date:   12/17/2024    Hemoglobin A1C     Standing Status:   Future     Number of Occurrences:   1     Standing Expiration Date:   12/17/2024    CBC Without Differential     Standing Status:   Future     Number of Occurrences:   1     Standing Expiration Date:   12/17/2024    Comprehensive Metabolic Panel     Standing Status:   Future     Number of Occurrences:   1     Standing Expiration Date:   12/17/2024    Ambulatory referral/consult to Sleep Disorders     Standing Status:   Future     Standing Expiration Date:   11/19/2024     Referral Priority:   Routine     Referral Type:   Consultation     Requested Specialty:   Sleep Medicine     Number of Visits Requested:   1    Ambulatory referral/consult to Endo Procedure      Standing Status:   Future     Standing Expiration Date:   4/30/2024     Referral Priority:   Routine     Referral Type:   Consultation     Number of Visits Requested:   1    Ambulatory referral/consult to Urology     Standing Status:   Future     Standing Expiration Date:   11/19/2024     Referral Priority:   Routine     Referral Type:   Consultation     Referral Reason:   Specialty Services Required     Requested Specialty:   Urology     Number of Visits Requested:   1                             Upcoming Scheduled Appointments and Follow Up:    Future Appointments   Date Time Provider Department Center   10/19/2023 10:00 AM LAB, [facility] [place]       Follow Up [facility] (with who? when?): No follow-ups on file.        Extended Emergency Contact Information  Primary Emergency Contact: Gwen Torres  Address: [address] [place] [address] of [place]  Home Phone: [phone]  Mobile Phone: [phone]  Relation: Spouse      Aris Jiménez MD   Internal Medicine  10/19/2023 - 9:21 AM    I spent a  total of 18 minutes on the day of the visit.This includes face to face time and non-face to face time preparing to see the patient (eg, review of tests), obtaining and/or reviewing separately obtained history, documenting clinical information in the electronic or other health record, independently interpreting results and communicating results to the patient/family/caregiver, or care coordinator.

## 2023-10-20 LAB
ALBUMIN SERPL BCP-MCNC: 4.9 G/DL (ref 3.5–5.2)
ALP SERPL-CCNC: 52 U/L (ref 55–135)
ALT SERPL W/O P-5'-P-CCNC: 36 U/L (ref 10–44)
ANION GAP SERPL CALC-SCNC: 12 MMOL/L (ref 8–16)
AST SERPL-CCNC: 23 U/L (ref 10–40)
BILIRUB SERPL-MCNC: 1.2 MG/DL (ref 0.1–1)
BUN SERPL-MCNC: 16 MG/DL (ref 6–20)
CALCIUM SERPL-MCNC: 9.7 MG/DL (ref 8.7–10.5)
CHLORIDE SERPL-SCNC: 108 MMOL/L (ref 95–110)
CHOLEST SERPL-MCNC: 223 MG/DL (ref 120–199)
CHOLEST/HDLC SERPL: 4.6 {RATIO} (ref 2–5)
CO2 SERPL-SCNC: 22 MMOL/L (ref 23–29)
COMPLEXED PSA SERPL-MCNC: 1.6 NG/ML (ref 0–4)
CREAT SERPL-MCNC: 1.1 MG/DL (ref 0.5–1.4)
ERYTHROCYTE [DISTWIDTH] IN BLOOD BY AUTOMATED COUNT: 12.9 % (ref 11.5–14.5)
EST. GFR  (NO RACE VARIABLE): >60 ML/MIN/1.73 M^2
ESTIMATED AVG GLUCOSE: 111 MG/DL (ref 68–131)
GLUCOSE SERPL-MCNC: 116 MG/DL (ref 70–110)
HBA1C MFR BLD: 5.5 % (ref 4–5.6)
HCT VFR BLD AUTO: 45.6 % (ref 40–54)
HDLC SERPL-MCNC: 49 MG/DL (ref 40–75)
HDLC SERPL: 22 % (ref 20–50)
HGB BLD-MCNC: 14.9 G/DL (ref 14–18)
LDLC SERPL CALC-MCNC: 156.2 MG/DL (ref 63–159)
MCH RBC QN AUTO: 30.4 PG (ref 27–31)
MCHC RBC AUTO-ENTMCNC: 32.7 G/DL (ref 32–36)
MCV RBC AUTO: 93 FL (ref 82–98)
NONHDLC SERPL-MCNC: 174 MG/DL
PLATELET # BLD AUTO: 280 K/UL (ref 150–450)
PMV BLD AUTO: 11.5 FL (ref 9.2–12.9)
POTASSIUM SERPL-SCNC: 4.4 MMOL/L (ref 3.5–5.1)
PROT SERPL-MCNC: 8.1 G/DL (ref 6–8.4)
RBC # BLD AUTO: 4.9 M/UL (ref 4.6–6.2)
SODIUM SERPL-SCNC: 142 MMOL/L (ref 136–145)
TRIGL SERPL-MCNC: 89 MG/DL (ref 30–150)
TSH SERPL DL<=0.005 MIU/L-ACNC: 1.09 UIU/ML (ref 0.4–4)
WBC # BLD AUTO: 7.59 K/UL (ref 3.9–12.7)

## 2023-11-10 ENCOUNTER — PATIENT MESSAGE (OUTPATIENT)
Dept: ENDOSCOPY | Facility: HOSPITAL | Age: 58
End: 2023-11-10

## 2023-11-10 ENCOUNTER — TELEPHONE (OUTPATIENT)
Dept: ENDOSCOPY | Facility: HOSPITAL | Age: 58
End: 2023-11-10

## 2023-11-10 NOTE — TELEPHONE ENCOUNTER
Spoke to patient to schedule procedure(s) Colonoscopy       Physician to perform procedure(s) Dr. TRENT Escalante  Date of Procedure (s) 3/1/24  Arrival Time 8:00 AM  Time of Procedure(s) 9:00 AM   Location of Procedure(s) [place] [address] Floor  Type of Rx Prep sent to patient: Suprep  Instructions provided to patient via [facility]    Patient was informed on the following information and verbalized understanding. Screening questionnaire reviewed with patient and complete. If procedure requires anesthesia, a responsible adult needs to be present to accompany the patient home, patient cannot drive after receiving anesthesia. Appointment details are tentative, especially check-in time. Patient will receive a prep-op call 4 days prior to confirm check-in time for procedure. If applicable the patient should contact their pharmacy to verify Rx for procedure prep is ready for pick-up. Patient was advised to call the scheduling department at [phone] if pharmacy states no Rx is available. Patient was advised to call the endoscopy scheduling department if any questions or concerns arise.      [facility] Scheduling Department

## 2023-12-14 ENCOUNTER — PATIENT MESSAGE (OUTPATIENT)
Dept: ADMINISTRATIVE | Facility: OTHER | Age: 58
End: 2023-12-14
Payer: COMMERCIAL

## 2024-01-09 ENCOUNTER — OFFICE VISIT (OUTPATIENT)
Dept: SLEEP MEDICINE | Facility: CLINIC | Age: 59
End: 2024-01-09
Payer: COMMERCIAL

## 2024-01-09 VITALS
HEART RATE: 69 BPM | OXYGEN SATURATION: 97 % | WEIGHT: 224.19 LBS | SYSTOLIC BLOOD PRESSURE: 136 MMHG | HEIGHT: 70 IN | DIASTOLIC BLOOD PRESSURE: 76 MMHG | BODY MASS INDEX: 32.1 KG/M2

## 2024-01-09 DIAGNOSIS — R29.818 SUSPECTED SLEEP APNEA: Primary | ICD-10-CM

## 2024-01-09 DIAGNOSIS — R01.1 HEART MURMUR: ICD-10-CM

## 2024-01-09 DIAGNOSIS — E66.9 OBESITY, UNSPECIFIED CLASSIFICATION, UNSPECIFIED OBESITY TYPE, UNSPECIFIED WHETHER SERIOUS COMORBIDITY PRESENT: ICD-10-CM

## 2024-01-09 DIAGNOSIS — R06.81 WITNESSED APNEIC SPELLS: ICD-10-CM

## 2024-01-09 DIAGNOSIS — G47.19 EXCESSIVE DAYTIME SLEEPINESS: ICD-10-CM

## 2024-01-09 PROCEDURE — 99999 PR PBB SHADOW E&M-EST. PATIENT-LVL III: CPT | Mod: PBBFAC,,, | Performed by: INTERNAL MEDICINE

## 2024-01-09 PROCEDURE — 3078F DIAST BP <80 MM HG: CPT | Mod: CPTII,S$GLB,, | Performed by: INTERNAL MEDICINE

## 2024-01-09 PROCEDURE — 1159F MED LIST DOCD IN RCRD: CPT | Mod: CPTII,S$GLB,, | Performed by: INTERNAL MEDICINE

## 2024-01-09 PROCEDURE — 99204 OFFICE O/P NEW MOD 45 MIN: CPT | Mod: S$GLB,,, | Performed by: INTERNAL MEDICINE

## 2024-01-09 PROCEDURE — 3008F BODY MASS INDEX DOCD: CPT | Mod: CPTII,S$GLB,, | Performed by: INTERNAL MEDICINE

## 2024-01-09 PROCEDURE — 3075F SYST BP GE 130 - 139MM HG: CPT | Mod: CPTII,S$GLB,, | Performed by: INTERNAL MEDICINE

## 2024-01-09 NOTE — PROGRESS NOTES
Subjective:   Patient ID: Hector Torres is a 58 y.o. male    Chief Complaint:   Chief Complaint   Patient presents with    Apnea    insomia     Light sleeper       Referred by Dr. Aris Jiménez    HPI  Hector Torres is a 58 y.o. male who was referred by Dr. Aris Jiménez for a sleep evaluation for possible sleep apnea The patient  has a past medical history of Heart murmur (9/28/2018), History of melanoma in situ (9/28/2018), and Latent tuberculosis by skin test (9/28/2018).     The patient presents today due to reports of witnessed abnormal breathing and apneas noticed by his wife. He also snores. He has had disruption to sleep maintenance.  The patient reports diurnal fatigue noticeable when he is sedentary.    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 Travels regularly to [place] for work for 1 week at a time every 2 months on average.    Patient does  drink 2-3 caffeinated beverages daily.    Weight: Patient reports gaining weight over the last several months  Wt Readings from Last 150 Encounters:   01/09/24 101.7 kg (224 lb 3.3 oz)   10/19/23 97.8 kg (215 lb 9.8 oz)   04/24/23 95.3 kg (210 lb)   03/13/23 97.5 kg (215 lb)   10/14/22 97.6 kg (215 lb 2.7 oz)   05/26/22 95.4 kg (210 lb 5.1 oz)   10/12/21 98.8 kg (217 lb 13 oz)   08/10/21 89.4 kg (197 lb)   10/01/20 89.4 kg (197 lb 1.5 oz)   09/30/19 92.3 kg (203 lb 7.8 oz)   11/02/18 89.8 kg (198 lb)   09/28/18 92.9 kg (204 lb 12.9 oz)       Symptoms of restless legs syndrome are not reported.    Prior sleep evaluation: none    Sleep medication taken: none.    Other sleep remedies: none    Sleep summary during the workdays per patient report:  Bedtime: 11 PM  Sleep Latency: 5 min  # Awakenings: 4-5  WASO (wakefulness after sleep onset) a few min  Risetime: 5:30 -6:00 AM    Sleep summary during days off per patient report:  Bedtime: 11:00 PM  Sleep Latency: 5 min  # Awakenings: 4-5   WASO (wakefulness after sleep onset) a few  min  Risetime: 7:00 - 7:30 AM    Naps are taken frequently.    Other relevant sleep data:    Currently, vivid dreams are not reported, nightmares are not reported, sleep paralysis is not reported, cataplexy is not reported, hypnagogic hallucinations are not reported and hypnopompic hallucinations are not reported.  There are no reports of sleep talking no sleep walking.  The bedroom environment is  adequate and  comfortable.          No data to display                   No data to display                CONTRIBUTING and/or CONFOUNDING FACTORS:    Nocturnal pain:   n    Nocturia >2/night:   n  Heartburn/GI pain:   n  Environment:    n  Bed partner noise/movements : n      Patient provided ESS:    [place] Sleepiness Scale TOTAL   (validated sleepiness questionnaire with a higher score indicating greater sleepiness; range 0-24)      1/9/2024     2:32 PM   EPWORTH SLEEPINESS SCALE   Sitting and reading 2   Watching TV 3   Sitting, inactive in a public place (e.g. a theatre or a meeting) 3   As a passenger in a car for an hour without a break 2   Lying down to rest in the afternoon when circumstances permit 2   Sitting and talking to someone 0   Sitting quietly after a lunch without alcohol 1   In a car, while stopped for a few minutes in traffic 0   Total score 13         STOP BANG questionnaire:    Snoring present: y  Tiredness present:y  Obstruction (apneas/choking episodes): y  Pressure (HTN): n    BMI greater than 35 kg/m2: n  Age greater than 50 years old: y  Neck circumference > than 17 inches if male or > than 16 inches if female : y  Gender being male: y    Total STOP BANG score = 6/8  Low risk CATINA: 0-2, Intermediate risk CATINA: 3-4, High risk CATINA: 5+         No data to display                   No data to display                Most Recent Vital Signs:    The patient's body mass index is 32.17 kg/m².    Wt Readings from Last 5 Encounters:   01/09/24 101.7 kg (224 lb 3.3 oz)   10/19/23 97.8 kg (215 lb 9.8 oz)  "  04/24/23 95.3 kg (210 lb)   03/13/23 97.5 kg (215 lb)   10/14/22 97.6 kg (215 lb 2.7 oz)     BMI Readings from Last 5 Encounters:   01/09/24 32.17 kg/m²   10/19/23 31.84 kg/m²   04/24/23 31.01 kg/m²   03/13/23 31.75 kg/m²   10/14/22 31.77 kg/m²     Pulse Readings from Last 3 Encounters:   01/09/24 69   10/19/23 67   04/24/23 100       No current outpatient medications on file.         Objective:   Vitals reviewed   Physical Exam  Constitutional:       Appearance: Normal appearance. He is obese.   HENT:      Head: Normocephalic.      Mouth/Throat:      Comments: Mallampati III; micrognathia present  Neck:      Comments: 18" neck circumference  Cardiovascular:      Rate and Rhythm: Normal rate and regular rhythm.   Pulmonary:      Effort: Pulmonary effort is normal. No respiratory distress.      Breath sounds: No wheezing or rales.   Musculoskeletal:      Cervical back: Normal range of motion and neck supple.   Neurological:      General: No focal deficit present.      Mental Status: He is alert and oriented to person, place, and time.   Psychiatric:         Mood and Affect: Mood normal.         Behavior: Behavior normal.         Assessment:     Problem List Items Addressed This Visit          Cardiac/Vascular    Heart murmur    Overview     9/28/18 2/6          Other Visit Diagnoses       Suspected sleep apnea    -  Primary    Witnessed apneic spells        Obesity, unspecified classification, unspecified obesity type, unspecified whether serious comorbidity present        Excessive daytime sleepiness                  Plan:       Could likely be secondary to sleep disordered breathing of obstructive origin.  Reviewed STOP BANG and ESS.    Patient has been informed about the pathophysiology and prognosis associated with CATINA and CSA and has been advised to undergo a baseline Polysomnography (PSG) study for further evaluation of his sleep disorder breathing.    Patient understood and agreed to undergo for a PSG study. " Will order Home Sleep Study and proceed with further studies or prescription for CPAP as appropriate.    Today's office encounter included review of salient clinical notes from others involved in the care of the patient, discrete laboratory elements and, as available, prior and present intervention for the disturbance of sleep.  The information gleaned was used in establishing the diagnosis and in stratification of disease risk.    The patient has a disturbance of sleep with clinically relevant potential for adverse behavioral, cardiovascular, and metabolic outcomes.  Untreated, the disturbance of sleep can have significant effect on mental health, clinical health and survival.         Will follow-up patient with results of PSG

## 2024-01-10 ENCOUNTER — OFFICE VISIT (OUTPATIENT)
Dept: URGENT CARE | Facility: CLINIC | Age: 59
End: 2024-01-10
Payer: COMMERCIAL

## 2024-01-10 VITALS
RESPIRATION RATE: 18 BRPM | DIASTOLIC BLOOD PRESSURE: 83 MMHG | HEART RATE: 73 BPM | BODY MASS INDEX: 32.07 KG/M2 | WEIGHT: 224 LBS | HEIGHT: 70 IN | OXYGEN SATURATION: 97 % | SYSTOLIC BLOOD PRESSURE: 170 MMHG | TEMPERATURE: 99 F

## 2024-01-10 DIAGNOSIS — M54.31 SCIATICA OF RIGHT SIDE: Primary | ICD-10-CM

## 2024-01-10 PROCEDURE — 99213 OFFICE O/P EST LOW 20 MIN: CPT | Mod: S$GLB,,, | Performed by: NURSE PRACTITIONER

## 2024-01-10 RX ORDER — DICLOFENAC SODIUM 75 MG/1
75 TABLET, DELAYED RELEASE ORAL 2 TIMES DAILY
Qty: 30 TABLET | Refills: 0 | Status: SHIPPED | OUTPATIENT
Start: 2024-01-10

## 2024-01-10 NOTE — PATIENT INSTRUCTIONS
Stay as active as you can without causing too much pain. It is OK to rest your back for a day or so. Be sure to get up and move around gently during the day as you are able. After a few days, slowly start to increase your activity level as you are able to. If something causes your pain to come back or get worse, stop and go back to doing easier activities that did not hurt.  Do not sit or  one position for a long time. You may want to sleep with a pillow under or between your knees if this eases your pain.  You may want to take medicines like ibuprofen or naproxen for swelling and pain. These are nonsteroidal anti-inflammatory drugs (NSAIDS).

## 2024-01-10 NOTE — PROGRESS NOTES
"Subjective:      Patient ID: Hector Torres is a 58 y.o. male.    Vitals:  height is 5' 10" (1.778 m) and weight is 101.6 kg (224 lb). His oral temperature is 98.6 °F (37 °C). His blood pressure is 170/83 (abnormal) and his pulse is 73. His respiration is 18 and oxygen saturation is 97%.     Chief Complaint: Hip Pain    Pt. Presents c.o. right hip and leg pain.Symps include a sharp and throbbing pain.Symps began 2 days ago.    Hip Pain   The incident occurred 2 days ago. There was no injury mechanism. The pain is present in the right hip. The quality of the pain is described as aching. The pain is at a severity of 8/10. The pain is moderate. The pain has been Constant since onset. Associated symptoms include an inability to bear weight and tingling. Pertinent negatives include no loss of motion, loss of sensation, muscle weakness or numbness. He reports no foreign bodies present. The symptoms are aggravated by movement and weight bearing. He has tried acetaminophen for the symptoms. The treatment provided no relief.       Constitution: Negative.   HENT: Negative.     Neck: neck negative.   Cardiovascular: Negative.    Respiratory: Negative.     Musculoskeletal:  Positive for pain with walking.        + pain radiating from R buttocks down R leg   Neurological:  Negative for numbness.      Objective:     Physical Exam   HENT:   Head: Normocephalic.   Pulmonary/Chest: Effort normal.   Musculoskeletal:         General: Tenderness present. No swelling or deformity.   Neurological: He is alert.   Skin: Skin is warm and dry. Capillary refill takes less than 2 seconds.       Assessment:     1. Sciatica of right side        Plan:   Mr. Torres states he beginning having pain to R buttocks radiating down his leg 2 days ago. States pain started after playing soccer and lifting weights.    Sciatica of right side  -     diclofenac (VOLTAREN) 75 MG EC tablet; Take 1 tablet (75 mg total) by mouth 2 (two) times daily.  " Dispense: 30 tablet; Refill: 0      Patient Instructions   Stay as active as you can without causing too much pain. It is OK to rest your back for a day or so. Be sure to get up and move around gently during the day as you are able. After a few days, slowly start to increase your activity level as you are able to. If something causes your pain to come back or get worse, stop and go back to doing easier activities that did not hurt.  Do not sit or  one position for a long time. You may want to sleep with a pillow under or between your knees if this eases your pain.  You may want to take medicines like ibuprofen or naproxen for swelling and pain. These are nonsteroidal anti-inflammatory drugs (NSAIDS).

## 2024-01-11 ENCOUNTER — TELEPHONE (OUTPATIENT)
Dept: SLEEP MEDICINE | Facility: OTHER | Age: 59
End: 2024-01-11
Payer: COMMERCIAL

## 2024-01-16 ENCOUNTER — TELEPHONE (OUTPATIENT)
Dept: NEUROLOGY | Facility: CLINIC | Age: 59
End: 2024-01-16
Payer: COMMERCIAL

## 2024-01-16 ENCOUNTER — PATIENT MESSAGE (OUTPATIENT)
Dept: NEUROLOGY | Facility: CLINIC | Age: 59
End: 2024-01-16
Payer: COMMERCIAL

## 2024-01-16 DIAGNOSIS — M25.551 RIGHT HIP PAIN: Primary | ICD-10-CM

## 2024-01-16 NOTE — TELEPHONE ENCOUNTER
Spoke with pt on several instances throughout the day. For a little over a week, pt has experienced throbbing right hip and leg pain confounded with numbness and tingling radiating down, now felt knee to ankle. Denies warmth, swelling, or redness.   Did present to UC last week but was given a diagnosis of sciatica and an NSAID without any discussion or physical assessment.   Spoke with the patient. Chief complaint is lateral hip pain with associated numbness. Offered next available appointment tomorrow at 3 with Dr Kumar. Initial offer was available today virtually and pt preferred in person visit.   Hip Xray ordered and scheduled prior to clinic visit.  Pt accepted next available, verbalized understanding, and repeated back date/time/location of scheduled appointments.

## 2024-01-16 NOTE — TELEPHONE ENCOUNTER
----- Message from Lorrie Cordova sent at 1/16/2024 12:24 PM CST -----  Regarding: appt  Contact: pt @ [phone]  Pt is returning a missed call from someone in the office and is asking for a return call back soon. Thanks.     Reason for call:misas call and appt           Patient requesting call back or [facility] msg: Please call pt @ [phone]

## 2024-01-17 ENCOUNTER — OFFICE VISIT (OUTPATIENT)
Dept: NEUROLOGY | Facility: CLINIC | Age: 59
End: 2024-01-17
Payer: COMMERCIAL

## 2024-01-17 ENCOUNTER — HOSPITAL ENCOUNTER (OUTPATIENT)
Dept: RADIOLOGY | Facility: HOSPITAL | Age: 59
Discharge: HOME | End: 2024-01-17
Attending: PSYCHIATRY & NEUROLOGY
Payer: COMMERCIAL

## 2024-01-17 VITALS
WEIGHT: 219.13 LBS | BODY MASS INDEX: 31.37 KG/M2 | DIASTOLIC BLOOD PRESSURE: 79 MMHG | SYSTOLIC BLOOD PRESSURE: 129 MMHG | HEART RATE: 76 BPM | HEIGHT: 70 IN

## 2024-01-17 DIAGNOSIS — R06.83 SNORING: ICD-10-CM

## 2024-01-17 DIAGNOSIS — M25.551 RIGHT HIP PAIN: ICD-10-CM

## 2024-01-17 DIAGNOSIS — M48.062 SPINAL STENOSIS OF LUMBAR REGION WITH NEUROGENIC CLAUDICATION: ICD-10-CM

## 2024-01-17 DIAGNOSIS — M54.16 LUMBAR RADICULOPATHY: Primary | ICD-10-CM

## 2024-01-17 DIAGNOSIS — M54.9 DORSALGIA, UNSPECIFIED: ICD-10-CM

## 2024-01-17 PROCEDURE — 1159F MED LIST DOCD IN RCRD: CPT | Mod: CPTII,S$GLB,, | Performed by: PSYCHIATRY & NEUROLOGY

## 2024-01-17 PROCEDURE — 3078F DIAST BP <80 MM HG: CPT | Mod: CPTII,S$GLB,, | Performed by: PSYCHIATRY & NEUROLOGY

## 2024-01-17 PROCEDURE — 73502 X-RAY EXAM HIP UNI 2-3 VIEWS: CPT | Mod: TC,RT

## 2024-01-17 PROCEDURE — 99205 OFFICE O/P NEW HI 60 MIN: CPT | Mod: S$GLB,,, | Performed by: PSYCHIATRY & NEUROLOGY

## 2024-01-17 PROCEDURE — 73502 X-RAY EXAM HIP UNI 2-3 VIEWS: CPT | Mod: 26,RT,, | Performed by: RADIOLOGY

## 2024-01-17 PROCEDURE — 99999 PR PBB SHADOW E&M-EST. PATIENT-LVL III: CPT | Mod: PBBFAC,,, | Performed by: PSYCHIATRY & NEUROLOGY

## 2024-01-17 PROCEDURE — 1160F RVW MEDS BY RX/DR IN RCRD: CPT | Mod: CPTII,S$GLB,, | Performed by: PSYCHIATRY & NEUROLOGY

## 2024-01-17 PROCEDURE — G2211 COMPLEX E/M VISIT ADD ON: HCPCS | Mod: S$GLB,,, | Performed by: PSYCHIATRY & NEUROLOGY

## 2024-01-17 PROCEDURE — 3074F SYST BP LT 130 MM HG: CPT | Mod: CPTII,S$GLB,, | Performed by: PSYCHIATRY & NEUROLOGY

## 2024-01-17 PROCEDURE — 3008F BODY MASS INDEX DOCD: CPT | Mod: CPTII,S$GLB,, | Performed by: PSYCHIATRY & NEUROLOGY

## 2024-01-17 RX ORDER — CYCLOBENZAPRINE HCL 5 MG
5 TABLET ORAL 3 TIMES DAILY PRN
Qty: 30 TABLET | Refills: 1 | Status: SHIPPED | OUTPATIENT
Start: 2024-01-17 | End: 2024-02-06

## 2024-01-17 RX ORDER — ALPRAZOLAM 0.5 MG/1
0.5 TABLET ORAL 2 TIMES DAILY PRN
Qty: 2 TABLET | Refills: 0 | Status: ON HOLD | OUTPATIENT
Start: 2024-01-17 | End: 2024-03-01 | Stop reason: HOSPADM

## 2024-01-17 NOTE — PATIENT INSTRUCTIONS
Walk and exercise  Physiotherapy for back  Hydrotherapy  Massage therapy  In case of any question, plz contact through [facility].

## 2024-01-17 NOTE — PROGRESS NOTES
[facility] - NEUROLOGY 7TH [place] [facility], [place]    Date: 1/17/24  Patient Name: Hector Torres   MRN: [id number]   Referring Provider: Alexandre Mcfarland MD    Thank you so much Alexandre Mcfarland MD for your patient referral to Neuromuscular team at [facility]. We take pride in our care coordination and look forward to your feedback and questions.    Assessment:   Hector Torres is a 58 y.o. male is a very pleasant patient with probable right lumbosacral radiculopathy in the setting of absent right knee reflex for evaluation.  I discussed with the patient and family about different pain generator and different medication to address the pain.  I guided about medication overuse and suggested to do MRI lumbosacral spine for further evaluation.  He is planning to do air travel which may result in worsening of pain.  I gave him Flexeril as needed for muscle spasm but he can additionally use Voltaren as well.  He will update me in case of any worsening.    I discussed about need for Physiotherapy. I addressed his complaints. I provided information about fall precautions and healthy lifestyle.    I would wish him very best for improvement/recovery in his condition.    Future direction based on feedback:    Plan:     Problem List Items Addressed This Visit          Neuro    Lumbar radiculopathy - Primary    Relevant Medications    cyclobenzaprine (FLEXERIL) 5 MG tablet    ALPRAZolam (XANAX) 0.5 MG tablet    Other Relevant Orders    MRI Lumbar Spine Without Contrast     Other Visit Diagnoses       Dorsalgia, unspecified        Relevant Orders    MRI Lumbar Spine Without Contrast            Alma Kumar MD    This evaluation was completed in >75  Minutes over 50% of the time spent on education & counseling. This includes face to face time and non-face to face time preparing to see the patient (eg, review of tests), obtaining and/or reviewing separately obtained history,  documenting clinical information in the electronic or other health record, independently interpreting results and communicating results to the patient/family/caregiver, or care coordinator.    Visit today is associated with current or anticipated ongoing medical care related to this patient's single serious condition/complex condition (Lumbar radiculopathy). Follow up: 3 months    Patient note was created using MModal Dictation.  Any errors in syntax or even information may not have been identified and edited on initial review prior to signing this note.    Details provided by:    Patient  Family- Wife (Gwen) pharmacist by background but stay home mom.    Reason for visit:  Right leg pain    HISTORY OF PRESENT ILLNESS   Mr. Hector Torres is a 58 y.o. male presenting for evaluation of right leg pain.     The detail was confirmed with the patient who mentioned that he noticed pain on prolonged standing for some period of time in the past.  On 7th January, he played soccer on Sunday which is his usual routine and noticed some discomfort in right leg but on Monday pain worsened especially on walking.  He did gym with exercises focused on legs on Tuesday and noticed the worsening of pain in afternoon to the point that intensity was unbearable 8/10.  The nature of pain was shooting in character with some throbbing component.  His pain progressed down from proximal thigh to his calves.  He feels touching his ccalves is painful.  The pain worsened by putting pressure or weight on legs.  On 10th Jan, he was not able to walk due to worsening pain.  He was seen in urgent care and started on diclofenac.  15th January onward he noticed some improvement and he is able to walk.  He mentioned no weakness in lower extremities.  There is no history of bladder involvement.  There is no history of swallowing or breathing difficulty.      There is documented history of apnea during sleep and he is already booked for a sleep  study.    There is no history of smoking and he rarely drinks alcohol.  He is a  of [employer].    Chart Review:  Urgent care note on 01/09/2024  The incident occurred 2 days ago. There was no injury mechanism. The pain is present in the right hip. The quality of the pain is described as aching. The pain is at a severity of 8/10. The pain is moderate. The pain has been Constant since onset. Associated symptoms include an inability to bear weight and tingling. Pertinent negatives include no loss of motion, loss of sensation, muscle weakness or numbness. He reports no foreign bodies present. The symptoms are aggravated by movement and weight bearing. He has tried acetaminophen for the symptoms. The treatment provided no relief.      Pertinent work up based on chart review for current condition:  Comprehensive metabolic panel with total bilirubin 1.2   CBC normal   Hemoglobin A1c 5.5   TSH 1.0     X-ray of right hip on 01/17/2024.    Bones of the hips and pelvis are intact with some mild degenerative change.  No bony destruction is seen     Review of Systems:  12 system review of systems is negative except for the symptoms mentioned in HPI.       Past Medical History Social History   Past Medical History:   Diagnosis Date    Heart murmur 9/28/2018 9/28/18 2/6    History of melanoma in situ 9/28/2018    Lower back, sees derm for annual skin exam    Latent tuberculosis by skin test 9/28/2018    Treated      Social History     Socioeconomic History    Marital status:     Number of children: 3   Tobacco Use    Smoking status: Never    Smokeless tobacco: Never   Substance and Sexual Activity    Alcohol use: Yes     Alcohol/week: 6.0 standard drinks of alcohol     Types: 4 Cans of beer, 2 Drinks containing 0.5 oz of alcohol per week     Comment: 5 drinks/week    Drug use: Never    Sexual activity: Yes     Partners: Female     Birth control/protection: None   Social History Narrative     of [employer]  supp[employer] company    ; 3 children    Regular exercise     Social Determinants of Health     Physical Activity: Sufficiently Active (10/1/2020)    Exercise Vital Sign     Days of Exercise per Week: 5 days     Minutes of Exercise per Session: 60 min   Stress: No Stress Concern Present (10/1/2020)    [employer] [place] [employer] - Occupational Stress Questionnaire     Feeling of Stress : Only a little        Family History Past Surgical History   Family History   Problem Relation Age of Onset    Hypertension Mother     Arthritis Mother     Coronary artery disease Father         3v CABG    Hypertension Father     Hearing loss Father     No Known Problems Brother     No Known Problems Brother     Brain cancer Maternal Grandfather     Diabetes Mellitus Other     Colon cancer Neg Hx      Past Surgical History:   Procedure Laterality Date    COLONOSCOPY N/A 11/02/2018    Procedure: COLONOSCOPY;  Surgeon: Liu Carter MD;  [facility] ([address] [place] [address]);  Service: Endoscopy;  Laterality: N/A;    EYE SURGERY  1967    SKIN CANCER EXCISION      melanoma in situ on lower back        Allergies    Review of patient's allergies indicates:  No Known Allergies         Medications     Current Outpatient Medications   Medication Sig Dispense Refill    diclofenac (VOLTAREN) 75 MG EC tablet Take 1 tablet (75 mg total) by mouth 2 (two) times daily. 30 tablet 0     No current facility-administered medications for this visit.         LABS   Last CBC Results:   Lab Results   Component Value Date    WBC 7.59 10/19/2023    HGB 14.9 10/19/2023    HCT 45.6 10/19/2023     10/19/2023       Last CMP Results  Lab Results   Component Value Date     10/19/2023    K 4.4 10/19/2023     10/19/2023    CO2 22 (L) 10/19/2023    BUN 16 10/19/2023    CREATININE 1.1 10/19/2023    CALCIUM 9.7 10/19/2023    ALBUMIN 4.9 10/19/2023    AST 23 10/19/2023    ALT 36 10/19/2023       Last Lipids  Lab Results   Component Value Date     "CHOL 223 (H) 10/19/2023    TRIG 89 10/19/2023    HDL 49 10/19/2023    LDLCALC 156.2 10/19/2023       Last A1C  Lab Results   Component Value Date    HGBA1C 5.5 10/19/2023       Last TSH  Lab Results   Component Value Date    TSH 1.092 10/19/2023         PHYSICAL EXAMINATION     Vitals:    01/17/24 1508   BP: 129/79   Pulse: 76   Weight: 99.4 kg (219 lb 2.2 oz)   Height: 5' 10" (1.778 m)     Wt Readings from Last 3 Encounters:   01/10/24 1228 101.6 kg (224 lb)   01/09/24 1406 101.7 kg (224 lb 3.3 oz)   10/19/23 0911 97.8 kg (215 lb 9.8 oz)     Body mass index is 31.44 kg/m².     General: No acute distress, calm & cooperative  Head: Atraumatic, normocephalic  HEENT: PERRLA, EOMI, Oral mucosa moist   Neck: Supple, trachea midline  Cardiovascular: Regular rate and rhythm.  Pulmonary: CTAB, no increased work of breathing, no rhonchi or wheezing  Extremities: Warm, well-perfused, no significant edema  Psychiatric: Normal mood & affect; behavior normal & appropriate  Skin: No jaundice, rashes    GENERAL/CONSTITUTIONAL:    -Well appearing; well nourished  -FP-2    HIGHER INTEGRATIVE FUNCTIONS:   -Attention & concentration: Normal   -Orientation: Oriented to person, place & time  -Memory: Normal  -Language: Normal   -Fund of Knowledge: Normal     CRANIAL NERVES:   -CN 2: Visual fields full  -CN 2,3: PERRL  -CN 3,4,6: EOMI  -CN 5: Facial sensation intact bilaterally  -CN 7: Facial strength/movement intact bilaterally  -CN 8: Hearing normal bilaterally  -CN 9,10: Palate elevates symmetrically  -CN 11: Normal shoulder shrug and head turn  -CN 12: Tongue protrudes midline     MOTOR:   -Tone: normal in upper and lower extremities  -UE/LE motor:       Upper Ext Right Left Lower Ext Right Left   Shoulder Abd 5 5 Hip flexion 5 5   Elbow flexion 5 5 Knee extension 5 5   Elbow extension 5 5 Knee flexion 5 5   Fingers abduction 5 5 Ankle dorsiflexion 5 5   Wrist extension   Ankle plantar flexion 5 5   Wrist flexion   Great toe " dorsiflexion     Finger extension   Thigh adduction     Finger flexion   Thigh abduction     Thumb abduction            REFLEXES:      R L  R L   Triceps 2 2 Knee 1 2   Biceps 2 2 Ankle 1 1   BR 2 2        -Flexor plantar reflex bilaterally     SENSATION:   -Intact bilaterally to Vibration and pin prick    COORDINATION:   -FNF normal bilaterally    GAIT:   -Antalgic gait. Able to stand on heels and toes with support.  He is able to squat without difficulty.    -straight leg raise test is normal bilaterally  -loss of right abdominal fold  -Sacral dimples intact    Scheduled Follow-up :  Future Appointments   Date Time Provider Department Center   1/17/2024  3:00 PM Alma Kumar MD [facility] NEURO [facility]   2/8/2024  2:00 PM HOME STUDY, SLEEP [facility] [place] SLEEP [facility]       After Visit Medication List :     Medication List            Accurate as of January 17, 2024  2:56 PM. If you have any questions, ask your nurse or doctor.                CONTINUE taking these medications      diclofenac 75 MG EC tablet  Commonly known as: VOLTAREN  Take 1 tablet (75 mg total) by mouth 2 (two) times daily.              Signing Physician:          Alma Kumar MD  , [facility] [employer]  Neurology Consultant. [facility].   [address],  [facility]. [address]th [place] [address].

## 2024-01-19 ENCOUNTER — HOSPITAL ENCOUNTER (OUTPATIENT)
Dept: RADIOLOGY | Facility: HOSPITAL | Age: 59
Discharge: HOME | End: 2024-01-19
Attending: PSYCHIATRY & NEUROLOGY
Payer: COMMERCIAL

## 2024-01-19 DIAGNOSIS — M54.9 DORSALGIA, UNSPECIFIED: ICD-10-CM

## 2024-01-19 DIAGNOSIS — M54.16 LUMBAR RADICULOPATHY: ICD-10-CM

## 2024-01-19 PROCEDURE — 72148 MRI LUMBAR SPINE W/O DYE: CPT | Mod: 26,,, | Performed by: RADIOLOGY

## 2024-01-19 PROCEDURE — 72148 MRI LUMBAR SPINE W/O DYE: CPT | Mod: TC

## 2024-01-29 ENCOUNTER — OFFICE VISIT (OUTPATIENT)
Dept: NEUROSURGERY | Facility: CLINIC | Age: 59
End: 2024-01-29
Payer: COMMERCIAL

## 2024-01-29 VITALS — DIASTOLIC BLOOD PRESSURE: 87 MMHG | TEMPERATURE: 97 F | SYSTOLIC BLOOD PRESSURE: 135 MMHG | HEART RATE: 84 BPM

## 2024-01-29 DIAGNOSIS — M54.16 LUMBAR RADICULOPATHY: Primary | ICD-10-CM

## 2024-01-29 DIAGNOSIS — M48.062 LUMBAR STENOSIS WITH NEUROGENIC CLAUDICATION: ICD-10-CM

## 2024-01-29 PROCEDURE — 3079F DIAST BP 80-89 MM HG: CPT | Mod: CPTII,S$GLB,, | Performed by: NEUROLOGICAL SURGERY

## 2024-01-29 PROCEDURE — 99204 OFFICE O/P NEW MOD 45 MIN: CPT | Mod: S$GLB,,, | Performed by: NEUROLOGICAL SURGERY

## 2024-01-29 PROCEDURE — 99999 PR PBB SHADOW E&M-EST. PATIENT-LVL III: CPT | Mod: PBBFAC,,, | Performed by: NEUROLOGICAL SURGERY

## 2024-01-29 PROCEDURE — 3075F SYST BP GE 130 - 139MM HG: CPT | Mod: CPTII,S$GLB,, | Performed by: NEUROLOGICAL SURGERY

## 2024-01-29 PROCEDURE — 1159F MED LIST DOCD IN RCRD: CPT | Mod: CPTII,S$GLB,, | Performed by: NEUROLOGICAL SURGERY

## 2024-01-29 NOTE — PROGRESS NOTES
Neurosurgery  History & Physical    SUBJECTIVE:     Chief Complaint:  Patient was referred to us for evaluation of lower back pain with right-sided radiculopathy    History of Present Illness:  This is a 58-year-old male who had an acute exacerbation of lower back and right leg pain around 3 and half weeks ago after playing soccer.  The pain was initially just in the lower back radiating down to the knees.  Later on after a workout session which patient was doing dead lifts he felt pain started to go all the way down right leg stopping of the ankles.  Pain got so severe that patient had used his crutches and was unable to bear weight on the right leg.  He was traveling previsit had to get assistance with mobilization.  He denies any left leg symptoms.  Denies any bowel bladder issues.  He does not have a history of back issue but states that over the last several months he does have some intermittent right thigh discomfort with prolonged standing that is relief when he sits down.  Patient was treated with anti-inflammatories and then has helped his symptoms last week or so.  Patient today's room 2/10 compared to prior which was 8 9/10.  He is now able to walk normally he is able to bear weight.  He has no longer using crutches.  He was worked up with a MRI scan of the lumbar spine which shows lumbar stenosis.    Review of patient's allergies indicates:  No Known Allergies    Current Outpatient Medications   Medication Sig Dispense Refill    cyclobenzaprine (FLEXERIL) 5 MG tablet Take 1 tablet (5 mg total) by mouth 3 (three) times daily as needed for Muscle spasms. 30 tablet 1    diclofenac (VOLTAREN) 75 MG EC tablet Take 1 tablet (75 mg total) by mouth 2 (two) times daily. 30 tablet 0    ALPRAZolam (XANAX) 0.5 MG tablet Take 1 tablet (0.5 mg total) by mouth 2 (two) times daily as needed for Anxiety. (Patient not taking: Reported on 1/29/2024) 2 tablet 0     No current facility-administered medications for this  visit.       Past Medical History:   Diagnosis Date    Heart murmur 9/28/2018 9/28/18 2/6    History of melanoma in situ 9/28/2018    Lower back, sees derm for annual skin exam    Latent tuberculosis by skin test 9/28/2018    Treated     Past Surgical History:   Procedure Laterality Date    COLONOSCOPY N/A 11/02/2018    Procedure: COLONOSCOPY;  Surgeon: Liu Carter MD;  Location: [address]);  Service: Endoscopy;  Laterality: N/A;    EYE SURGERY  1967    SKIN CANCER EXCISION      melanoma in situ on lower back     Family History       Problem Relation (Age of Onset)    Arthritis Mother    Brain cancer Maternal Grandfather    Coronary artery disease Father    Diabetes Mellitus Other    Hearing loss Father    Hypertension Mother, Father    No Known Problems Brother, Brother          Social History     Socioeconomic History    Marital status:     Number of children: 3   Tobacco Use    Smoking status: Never    Smokeless tobacco: Never   Substance and Sexual Activity    Alcohol use: Yes     Alcohol/week: 6.0 standard drinks of alcohol     Types: 4 Cans of beer, 2 Drinks containing 0.5 oz of alcohol per week     Comment: 5 drinks/week    Drug use: Never    Sexual activity: Yes     Partners: Female     Birth control/protection: None   Social History Narrative     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      Neurosurgery Physical Exam    Is awake  alert appropriate.  His cranial nerves are intact.  He does have a positive straight leg raise on the right.  Negative straight leg raise on the left.  He is full strength in all muscle groups tested including almost a group of the lower extremities.  Reflexes equal and symmetric.  He is got good lumbar range of motion.  He is able to bit down touch his toes.  Stand on his toes.    Diagnostic Results:  EXAMINATION:  MRI LUMBAR SPINE WITHOUT CONTRAST     CLINICAL HISTORY:  Low back pain, no red flags, no prior management; Radiculopathy, lumbar region     TECHNIQUE:  Multiplanar, multisequence MR images were acquired from the thoracolumbar junction to the sacrum without the administration of contrast.     COMPARISON:  X-ray hip and pelvis 01/17/2024.     FINDINGS:  Alignment: Normal.     Vertebrae: Probable benign osseous hemangioma in L5 vertebral body.  No fracture.  No signal abnormality to suggest infiltrative process.  Small posterior projecting synovial cyst at the right L2 facet.     Discs: Mild disc height loss at L4-L5 and L5-S1.     Cord: Normal.  Conus terminates at L1     Degenerative findings:     T12-L1: Bilateral facet arthropathy.  No significant spinal canal stenosis or neural foraminal narrowing.     L1-L2: Bilateral facet arthropathy.  No significant spinal canal stenosis or neural foraminal narrowing.     L2-L3: Small diffuse posterior disc bulge, bilateral facet arthropathy and ligament flavum buckling.  Findings result in moderate spinal canal stenosis.     L3-L4: Diffuse posterior disc bulge, bilateral facet arthropathy and ligament flavum buckling.  Findings result in moderate/severe spinal canal stenosis.     L4-L5: Diffuse posterior disc bulge, bilateral facet arthropathy and ligament flavum buckling.  Findings result in severe spinal canal stenosis, severe right and mild left neural foraminal narrowing.     L5-S1: Small diffuse posterior disc bulge, bilateral facet arthropathy and ligament  flavum buckling.  Findings result in mild left neural foraminal narrowing.     Paraspinal muscles & soft tissues: Unremarkable.     Impression:     Lumbar spondylosis most notable at L4-L5 with severe spinal canal stenosis, severe right and mild left neural foraminal narrowing.    ASSESSMENT/PLAN:     58-year-old male with degenerative lumbar stenosis at L3-L4 and L4-L5.  He is worse at L4 at L5 with a significant disc bulge as well as bilateral facet hypertrophy and ligamentous thickening.  This is resulting severe central stenosis as well as foraminal stenosis right worse than left.  I think he must have aggravated the right L5 nerve root.  Things appeared to be to be settling down currently is radiculopathy is down to manageable level and he has got no focal motor deficit.  At this stage I do not think we need to jump into anything aggressive.  We would like for him to continues anti-inflammatories.  We would like to get him into the healthy back program.  We will see how he does.  I think at some point he will need a minimally invasive decompression but I would only reserve the for recurrence of radiculopathy or development of neurogenic claudication that failed conservative management.    We will recheck him in about 6 weeks        Note dictated with voice recognition software, please excuse any grammatical errors.

## 2024-02-07 ENCOUNTER — TELEPHONE (OUTPATIENT)
Dept: SLEEP MEDICINE | Facility: OTHER | Age: 59
End: 2024-02-07
Payer: COMMERCIAL

## 2024-02-08 ENCOUNTER — HOSPITAL ENCOUNTER (OUTPATIENT)
Dept: SLEEP MEDICINE | Facility: OTHER | Age: 59
Discharge: HOME | End: 2024-02-08
Attending: INTERNAL MEDICINE
Payer: COMMERCIAL

## 2024-02-08 DIAGNOSIS — E66.9 OBESITY, UNSPECIFIED CLASSIFICATION, UNSPECIFIED OBESITY TYPE, UNSPECIFIED WHETHER SERIOUS COMORBIDITY PRESENT: ICD-10-CM

## 2024-02-08 DIAGNOSIS — G47.33 OSA (OBSTRUCTIVE SLEEP APNEA): Primary | ICD-10-CM

## 2024-02-08 DIAGNOSIS — G47.19 EXCESSIVE DAYTIME SLEEPINESS: ICD-10-CM

## 2024-02-08 DIAGNOSIS — R29.818 SUSPECTED SLEEP APNEA: ICD-10-CM

## 2024-02-08 DIAGNOSIS — R06.81 WITNESSED APNEIC SPELLS: ICD-10-CM

## 2024-02-08 PROCEDURE — 95800 SLP STDY UNATTENDED: CPT

## 2024-02-09 PROBLEM — R06.81 WITNESSED APNEIC SPELLS: Status: ACTIVE | Noted: 2024-02-09

## 2024-02-21 ENCOUNTER — PATIENT MESSAGE (OUTPATIENT)
Dept: SLEEP MEDICINE | Facility: CLINIC | Age: 59
End: 2024-02-21
Payer: COMMERCIAL

## 2024-02-21 DIAGNOSIS — G47.33 OSA (OBSTRUCTIVE SLEEP APNEA): Primary | ICD-10-CM

## 2024-02-21 PROCEDURE — 95806 SLEEP STUDY UNATT&RESP EFFT: CPT | Mod: 26,,, | Performed by: INTERNAL MEDICINE

## 2024-03-01 ENCOUNTER — HOSPITAL ENCOUNTER (OUTPATIENT)
Facility: HOSPITAL | Age: 59
Discharge: HOME | End: 2024-03-01
Attending: INTERNAL MEDICINE | Admitting: INTERNAL MEDICINE
Payer: COMMERCIAL

## 2024-03-01 ENCOUNTER — ANESTHESIA (OUTPATIENT)
Dept: ENDOSCOPY | Facility: HOSPITAL | Age: 59
End: 2024-03-01
Payer: COMMERCIAL

## 2024-03-01 ENCOUNTER — ANESTHESIA EVENT (OUTPATIENT)
Dept: ENDOSCOPY | Facility: HOSPITAL | Age: 59
End: 2024-03-01
Payer: COMMERCIAL

## 2024-03-01 VITALS
OXYGEN SATURATION: 98 % | TEMPERATURE: 98 F | DIASTOLIC BLOOD PRESSURE: 83 MMHG | RESPIRATION RATE: 18 BRPM | HEIGHT: 70 IN | HEART RATE: 77 BPM | SYSTOLIC BLOOD PRESSURE: 123 MMHG | WEIGHT: 210 LBS | BODY MASS INDEX: 30.06 KG/M2

## 2024-03-01 DIAGNOSIS — Z86.010 HISTORY OF COLON POLYPS: Primary | ICD-10-CM

## 2024-03-01 PROCEDURE — 25000003 PHARM REV CODE 250: Performed by: NURSE ANESTHETIST, CERTIFIED REGISTERED

## 2024-03-01 PROCEDURE — 37000009 HC ANESTHESIA EA ADD 15 MINS: Performed by: INTERNAL MEDICINE

## 2024-03-01 PROCEDURE — E9220 PRA ENDO ANESTHESIA: HCPCS | Mod: ,,, | Performed by: NURSE ANESTHETIST, CERTIFIED REGISTERED

## 2024-03-01 PROCEDURE — 63600175 PHARM REV CODE 636 W HCPCS: Performed by: NURSE ANESTHETIST, CERTIFIED REGISTERED

## 2024-03-01 PROCEDURE — G0105 COLORECTAL SCRN; HI RISK IND: HCPCS | Performed by: INTERNAL MEDICINE

## 2024-03-01 PROCEDURE — G0105 COLORECTAL SCRN; HI RISK IND: HCPCS | Mod: ,,, | Performed by: INTERNAL MEDICINE

## 2024-03-01 PROCEDURE — 37000008 HC ANESTHESIA 1ST 15 MINUTES: Performed by: INTERNAL MEDICINE

## 2024-03-01 RX ORDER — SODIUM CHLORIDE 9 MG/ML
INJECTION, SOLUTION INTRAVENOUS CONTINUOUS
Status: DISCONTINUED | OUTPATIENT
Start: 2024-03-01 | End: 2024-03-01 | Stop reason: HOSPADM

## 2024-03-01 RX ORDER — LIDOCAINE HYDROCHLORIDE 20 MG/ML
INJECTION INTRAVENOUS
Status: DISCONTINUED | OUTPATIENT
Start: 2024-03-01 | End: 2024-03-01

## 2024-03-01 RX ORDER — SODIUM CHLORIDE 0.9 % (FLUSH) 0.9 %
10 SYRINGE (ML) INJECTION
Status: DISCONTINUED | OUTPATIENT
Start: 2024-03-01 | End: 2024-03-01 | Stop reason: HOSPADM

## 2024-03-01 RX ORDER — PROPOFOL 10 MG/ML
VIAL (ML) INTRAVENOUS CONTINUOUS PRN
Status: DISCONTINUED | OUTPATIENT
Start: 2024-03-01 | End: 2024-03-01

## 2024-03-01 RX ADMIN — PROPOFOL 175 MCG/KG/MIN: 10 INJECTION, EMULSION INTRAVENOUS at 09:03

## 2024-03-01 RX ADMIN — SODIUM CHLORIDE: 0.9 INJECTION, SOLUTION INTRAVENOUS at 09:03

## 2024-03-01 RX ADMIN — LIDOCAINE HYDROCHLORIDE 50 MG: 20 INJECTION INTRAVENOUS at 09:03

## 2024-03-01 NOTE — TRANSFER OF CARE
"Anesthesia Transfer of Care Note    Patient: Hector Torres    Procedure(s) Performed: Procedure(s) (LRB):  COLONOSCOPY (N/A)    Patient location: GI    Anesthesia Type: general    Transport from OR: Transported from OR on room air with adequate spontaneous ventilation    Post pain: adequate analgesia    Post assessment: no apparent anesthetic complications    Post vital signs: stable    Level of consciousness: awake    Nausea/Vomiting: no nausea/vomiting    Complications: none    Transfer of care protocol was followed      Last vitals: Visit Vitals  /69  (BP Location: Left arm, Patient Position: Sitting)   Pulse 98   Temp 36.6 °C (97.9 °F) (Temporal)   Resp 20   Ht 5' 10" (1.778 m)   Wt 95.3 kg (210 lb)   SpO2 96%   BMI 30.13 kg/m²     "

## 2024-03-01 NOTE — ANESTHESIA POSTPROCEDURE EVALUATION
Anesthesia Post Evaluation    Patient: Hector Torres    Procedure(s) Performed: Procedure(s) (LRB):  COLONOSCOPY (N/A)    Final Anesthesia Type: general      Patient location during evaluation: GI PACU  Patient participation: Yes- Able to Participate  Level of consciousness: awake and alert  Post-procedure vital signs: reviewed and stable  Pain management: adequate  Airway patency: patent    PONV status at discharge: No PONV  Anesthetic complications: no      Cardiovascular status: hemodynamically stable  Respiratory status: unassisted and spontaneous ventilation  Hydration status: euvolemic  Follow-up not needed.              Vitals Value Taken Time   /83 03/01/24 1015   Temp 36.7 °C (98.1 °F) 03/01/24 0945   Pulse 77 03/01/24 1015   Resp 18 03/01/24 1015   SpO2 98 % 03/01/24 1015         Event Time   Out of Recovery 10:25:15         Pain/Ignacio Score: Ignacio Score: 10 (3/1/2024  9:46 AM)

## 2024-03-01 NOTE — H&P
Short Stay Endoscopy History and Physical    PCP - Adore Salvador MD    Procedure - Colonoscopy  ASA - per anesthesia  Mallampati - per anesthesia  History of Anesthesia problems - no  Family history Anesthesia problems - no     HPI:  Hector Torres a 58 y.o. male here for a surveillance colonoscopy. He reports prior history of polyps on colonoscopy in 2015 (approximately 3; size unknown, but reportedly benign). He denies abdominal pain, melena, and hematochezia. He reports isolated family history of colon cancer in maternal grandmother.     ROS:  Constitutional: No fevers, chills, No weight loss  ENT: No allergies  CV: No chest pain  Pulm: No shortness of breath  GI: see HPI  Derm: No rash    Medical History:  has a past medical history of Heart murmur (9/28/2018), History of melanoma in situ (9/28/2018), and Latent tuberculosis by skin test (9/28/2018).    Surgical History:  has a past surgical history that includes Eye surgery (1967); Skin cancer excision; and Colonoscopy (N/A, 11/02/2018).    Family History: family history includes Arthritis in his mother; Brain cancer in his maternal grandfather; Coronary artery disease in his father; Diabetes Mellitus in an other family member; Hearing loss in his father; Hypertension in his father and mother; No Known Problems in his brother and brother.. Otherwise no colon cancer, inflammatory bowel disease, or GI malignancies.    Social History:  reports that he has never smoked. He has never used smokeless tobacco. He reports current alcohol use of about 6.0 standard drinks of alcohol per week. He reports that he does not use drugs.    Review of patient's allergies indicates:  No Known Allergies    Medications:   Medications Prior to Admission   Medication Sig Dispense Refill Last Dose    ALPRAZolam (XANAX) 0.5 MG tablet Take 1 tablet (0.5 mg total) by mouth 2 (two) times daily as needed for Anxiety. (Patient not taking: Reported on 1/29/2024) 2 tablet 0      diclofenac (VOLTAREN) 75 MG EC tablet Take 1 tablet (75 mg total) by mouth 2 (two) times daily. 30 tablet 0 More than a month         Objective Findings:    Vital Signs: see nursing notes    Physical Exam:  General Appearance: Well appearing in no acute distress  Eyes:    No scleral icterus  ENT: Neck supple  Lungs: CTA anteriorly  Heart:  S1, S2 normal, no murmurs heard  Abdomen: Soft, non tender, non distended with positive bowel sounds. No hepatosplenomegaly, ascites, or mass  Extremities: no edema  Skin: No rash      Labs:  Lab Results   Component Value Date    WBC 7.59 10/19/2023    HGB 14.9 10/19/2023    HCT 45.6 10/19/2023     10/19/2023    CHOL 223 (H) 10/19/2023    TRIG 89 10/19/2023    HDL 49 10/19/2023    ALT 36 10/19/2023    AST 23 10/19/2023     10/19/2023    K 4.4 10/19/2023     10/19/2023    CREATININE 1.1 10/19/2023    BUN 16 10/19/2023    CO2 22 (L) 10/19/2023    TSH 1.092 10/19/2023    PSA 1.6 10/19/2023    HGBA1C 5.5 10/19/2023         Assessment:   Hector Torres is a 58 y.o. male presenting today for a surveillance colonoscopy.      Plan:   -Proceed with scheduled procedure today. I have explained the risks and benefits of endoscopy procedures to the patient including but not limited to bleeding, perforation, infection, and death.  -Evaluation and consent for anesthesia portion of this procedure completed by anesthesia team.         Adiel Torres MD, PGY-VI  Gastroenterology Fellow  [facility]

## 2024-03-01 NOTE — PROVATION PATIENT INSTRUCTIONS
Discharge Summary/Instructions after an Endoscopic Procedure  Patient Name: Hector Torres  Patient MRN: [id number]  Patient YOB: 1965 Friday, March 1, 2024  Rashad Escalante MD  Dear patient,  As a result of recent federal legislation (The Federal Cures Act), you may   receive lab or pathology results from your procedure in your [facility]   account before your physician is able to contact you. Your physician or   their representative will relay the results to you with their   recommendations at their soonest availability.  Thank you,  RESTRICTIONS:  During your procedure today, you received medications for sedation.  These   medications may affect your judgment, balance and coordination.  Therefore,   for 24 hours, you have the following restrictions:   - DO NOT drive a car, operate machinery, make legal/financial decisions,   sign important papers or drink alcohol.    ACTIVITY:  Today: no heavy lifting, straining or running due to procedural   sedation/anesthesia.  The following day: return to full activity including work.  DIET:  Eat and drink normally unless instructed otherwise.     TREATMENT FOR COMMON SIDE EFFECTS:  - Mild abdominal pain, nausea, belching, bloating or excessive gas:  rest,   eat lightly and use a heating pad.  - Sore Throat: treat with throat lozenges and/or gargle with warm salt   water.  - Because air was used during the procedure, expelling large amounts of air   from your rectum or belching is normal.  - If a bowel prep was taken, you may not have a bowel movement for 1-3 days.    This is normal.  SYMPTOMS TO WATCH FOR AND REPORT TO YOUR PHYSICIAN:  1. Abdominal pain or bloating, other than gas cramps.  2. Chest pain.  3. Back pain.  4. Signs of infection such as: chills or fever occurring within 24 hours   after the procedure.  5. Rectal bleeding, which would show as bright red, maroon, or black stools.   (A tablespoon of blood from the rectum is not serious, especially if    hemorrhoids are present.)  6. Vomiting.  7. Weakness or dizziness.  GO DIRECTLY TO THE NEAREST EMERGENCY ROOM IF YOU HAVE ANY OF THE FOLLOWING:      Difficulty breathing              Chills and/or fever over 101 F   Persistent vomiting and/or vomiting blood   Severe abdominal pain   Severe chest pain   Black, tarry stools   Bleeding- more than one tablespoon   Any other symptom or condition that you feel may need urgent attention  Your doctor recommends these additional instructions:  If any biopsies were taken, your doctors clinic will contact you in 1 to 2   weeks with any results.  - Discharge patient to home (ambulatory).   - Patient has a contact number available for emergencies.  The signs and   symptoms of potential delayed complications were discussed with the   patient.  Return to normal activities tomorrow.  Written discharge   instructions were provided to the patient.   - Resume previous diet.   - Continue present medications.   - Return to primary care physician as previously scheduled.   - Repeat colonoscopy in 10 years for surveillance.  For questions, problems or results please call your physician - Rashad Escalante MD at Work:  [phone].  [facility], EMERGENCY ROOM PHONE NUMBER: [phone]  IF A COMPLICATION OR EMERGENCY SITUATION ARISES AND YOU ARE UNABLE TO REACH   YOUR PHYSICIAN - GO DIRECTLY TO THE EMERGENCY ROOM.  Rashad Escalante MD  3/1/2024 9:37:35 AM  This report has been verified and signed electronically.  Dear patient,  As a result of recent federal legislation (The Federal Cures Act), you may   receive lab or pathology results from your procedure in your [facility]   account before your physician is able to contact you. Your physician or   their representative will relay the results to you with their   recommendations at their soonest availability.  Thank you,  PROVATION

## 2024-03-01 NOTE — ANESTHESIA PREPROCEDURE EVALUATION
03/01/2024  Hector Torres is a 58 y.o., male.  Past Medical History:   Diagnosis Date    Heart murmur 9/28/2018 9/28/18 2/6    History of melanoma in situ 9/28/2018    Lower back, sees derm for annual skin exam    Latent tuberculosis by skin test 9/28/2018    Treated     Past Surgical History:   Procedure Laterality Date    COLONOSCOPY N/A 11/02/2018    Procedure: COLONOSCOPY;  Surgeon: Liu Carter MD;  Location: [address]);  Service: Endoscopy;  Laterality: N/A;    EYE SURGERY  1967    SKIN CANCER EXCISION      melanoma in situ on lower back         Pre-op Assessment    I have reviewed the Patient Summary Reports.     I have reviewed the Nursing Notes. I have reviewed the NPO Status.   I have reviewed the Medications.     Review of Systems  Anesthesia Hx:  No problems with previous Anesthesia             Denies Family Hx of Anesthesia complications.    Denies Personal Hx of Anesthesia complications.                    Hematology/Oncology:  Hematology Normal                                     EENT/Dental:  EENT/Dental Normal           Cardiovascular:  Cardiovascular Normal                                            Pulmonary:        Sleep Apnea                Renal/:  Renal/ Normal                 Hepatic/GI:  Bowel Prep.                Musculoskeletal:  Musculoskeletal Normal                Neurological:    Neuromuscular Disease,                                   Endocrine:  Endocrine Normal            Dermatological:  Skin Normal    Psych:  Psychiatric Normal                    Physical Exam  General: Well nourished    Airway:  Mallampati: II   Mouth Opening: Normal  TM Distance: Normal  Tongue: Normal  Neck ROM: Normal ROM    Dental:  Intact        Anesthesia Plan  Type of Anesthesia, risks & benefits discussed:    Anesthesia Type: Gen Natural Airway  Intra-op Monitoring Plan:  Standard ASA Monitors  Informed Consent: Informed consent signed with the Patient and all parties understand the risks and agree with anesthesia plan.  All questions answered.   ASA Score: 2  Day of Surgery Review of History & Physical: H&P Update referred to the surgeon/provider.I have interviewed and examined the patient. I have reviewed the patient's H&P dated: There are no significant changes.     Ready For Surgery From Anesthesia Perspective.     .

## 2024-03-07 ENCOUNTER — PATIENT MESSAGE (OUTPATIENT)
Dept: NEUROSURGERY | Facility: CLINIC | Age: 59
End: 2024-03-07
Payer: COMMERCIAL

## 2024-03-18 ENCOUNTER — CLINICAL SUPPORT (OUTPATIENT)
Dept: REHABILITATION | Facility: OTHER | Age: 59
End: 2024-03-18
Attending: NEUROLOGICAL SURGERY
Payer: COMMERCIAL

## 2024-03-18 DIAGNOSIS — M54.16 LUMBAR RADICULOPATHY: ICD-10-CM

## 2024-03-18 DIAGNOSIS — M25.69 DECREASED RANGE OF MOTION OF TRUNK AND BACK: Primary | ICD-10-CM

## 2024-03-18 DIAGNOSIS — R29.3 POSTURAL IMBALANCE: ICD-10-CM

## 2024-03-18 DIAGNOSIS — M48.062 LUMBAR STENOSIS WITH NEUROGENIC CLAUDICATION: ICD-10-CM

## 2024-03-18 PROCEDURE — 97110 THERAPEUTIC EXERCISES: CPT

## 2024-03-18 PROCEDURE — 97161 PT EVAL LOW COMPLEX 20 MIN: CPT

## 2024-03-18 PROCEDURE — 97530 THERAPEUTIC ACTIVITIES: CPT

## 2024-03-18 NOTE — PROGRESS NOTES
[facility]  Physical Therapy Lumbar Evaluation      Name: Hector Torres  Clinic Number: [id number]    Therapy Diagnosis:   Encounter Diagnoses   Name Primary?    Lumbar radiculopathy     Lumbar stenosis with neurogenic claudication     Decreased range of motion of trunk and back Yes    Postural imbalance          Physician: Tito Tavarez MD    Physician Orders: PT Eval and Treat  Medical Diagnosis from Referral:   M54.16 (ICD-10-CM) - Lumbar radiculopathy   M48.062 (ICD-10-CM) - Lumbar stenosis with neurogenic claudication     Evaluation Date: 3/18/2024  Authorization Period Expiration: 12/31/24  Plan of Care Expiration: 5/27/2024  Reassessment Due: 4/18/2024  Visit # / Visits authorized: 01/01  ADMIN FOTO AND INTAKE PAPERWORK VISIT 2  MedX testing visit 2    Time In: 12:45 pm   Time Out: 1:30 pm   Total Billable Time: 45 minutes  INSURANCE and OUTCOMES: Fee for Service with FOTO Outcomes 1/3    Precautions: standard    Pattern of pain determined: Pattern 1 undetermined    Subjective     Date of onset: 3 months ago /p workout  History of current condition: Hector reports in past B LBP /c progression down RLE.  Patient note sig improvement in RLE sx since referral MD visit and clarifies at that time has sig sx improvement when meeting MD also. Patient report in the flare up patient note sig pain intensity and difficulty /c all motions. Patient note initially using crutches to walk due to pain in RLE.    At present, patient note intermittent numbness down R lat thigh especially /c extended standing (1 hr).   Presently, patient note LB stiffness in AM.  Patient feels he has dec spinal mobility.  Patient report minor discomfort /c don/doff sock/shoe in AM.      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 However, patient note no sig sx presentation as a result.   After flare up, patient patient report having stopped  recreational soccer and discontinuing his regular gym visits that included .  Patient clarify he stopped these out of concern for inc harm and potential inc discomfort.  As a result, patient feels sig less active since injury.    Patient note no prior PT for LB.   However, patient note 5 x wk gym visits and would like to return to this.     Per MD report   This is a 58-year-old male who had an acute exacerbation of lower back and right leg pain around 3 and half weeks ago after playing soccer. The pain was initially just in the lower back radiating down to the knees. Later on after a workout session which patient was doing dead lifts he felt pain started to go all the way down right leg stopping of the ankles. Pain got so severe that patient had used his crutches and was unable to bear weight on the right leg. He was traveling previsit had to get assistance with mobilization. He denies any left leg symptoms. Denies any bowel bladder issues. He does not have a history of back issue but states that over the last several months he does have some intermittent right thigh discomfort with prolonged standing that is relief when he sits down. Patient was treated with anti-inflammatories and then has helped his symptoms last week or so. Patient today's room 2/10 compared to prior which was 8 9/10. He is now able to walk normally he is able to bear weight. He has no longer using crutches. He was worked up with a MRI scan of the lumbar spine which shows lumbar stenosis.           Medical History:   Past Medical History:   Diagnosis Date    Heart murmur 9/28/2018 9/28/18 2/6    History of melanoma in situ 9/28/2018    Lower back, sees derm for annual skin exam    Latent tuberculosis by skin test 9/28/2018    Treated       Surgical History:   Hector Torres  has a past surgical history that includes Eye surgery (1967); Skin cancer excision; Colonoscopy (N/A, 11/02/2018); and Colonoscopy (N/A,  3/1/2024).    Medications:   Hector currently has no medications in their medication list.    Allergies:   Review of patient's allergies indicates:  No Known Allergies     Imaging:   EXAMINATION:  MRI LUMBAR SPINE WITHOUT CONTRAST     FINDINGS:  Alignment: Normal.  Vertebrae: Probable benign osseous hemangioma in L5 vertebral body.  No fracture.  No signal abnormality to suggest infiltrative process.  Small posterior projecting synovial cyst at the right L2 facet.  Discs: Mild disc height loss at L4-L5 and L5-S1.  Cord: Normal.  Conus terminates at L1     Degenerative findings:  T12-L1: Bilateral facet arthropathy.  No significant spinal canal stenosis or neural foraminal narrowing.  L1-L2: Bilateral facet arthropathy.  No significant spinal canal stenosis or neural foraminal narrowing.  L2-L3: Small diffuse posterior disc bulge, bilateral facet arthropathy and ligament flavum buckling.  Findings result in moderate spinal canal stenosis.  L3-L4: Diffuse posterior disc bulge, bilateral facet arthropathy and ligament flavum buckling.  Findings result in moderate/severe spinal canal stenosis.  L4-L5: Diffuse posterior disc bulge, bilateral facet arthropathy and ligament flavum buckling.  Findings result in severe spinal canal stenosis, severe right and mild left neural foraminal narrowing.  L5-S1: Small diffuse posterior disc bulge, bilateral facet arthropathy and ligament flavum buckling.  Findings result in mild left neural foraminal narrowing   Paraspinal muscles & soft tissues: Unremarkable.     Impression:  Lumbar spondylosis most notable at L4-L5 with severe spinal canal stenosis, severe right and mild left neural foraminal narrowing.    Prior Therapy: none   Prior Treatment: none   Social History:  lives in a 3 level home  lives with family (wife, 3 kids - older teens)  Occupation:    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  extended sitting?   Leisure:    soccer, gym visits, watching sons play soccer  Prior Level of  "Function: regular gym visits, weekend soccer league  Current Level of Function:  stopped playing soccer, stopped going to gym, dec standing tolerance   DME owned/used: No  Gym Membership: Yes, was going 4 x wk /c     Pain:  Current 0/10, worst 1/10 stiffness in AM, best 0/10   Location: bilateral LB R upper leg   Description: LB - stiffness R upper leg - numbness   Aggravating Factors: Standing and Morning  Easing Factors: sitting   Disturbed Sleep: No     Pattern of pain questions:  1.  Where is your pain the worst? RLE   2.  Is your pain constant or intermittent? Intermittent   3.  Does bending forward make your typical pain worse? In AM Yes  In PM feels relief    4.  Since the start of your back pain, has there been a change in your bowel or bladder? N  5.  What can't you do now that you use to be able to do? Recreational soccer and gym visits    Pts goals: "have an exercise program to prepare me for return to gym"     Red Flag Screening:   Cough/Sneeze Strain: (--)  Bladder/Bowel: (--)  Falls: (--)  Night pain: (--)  Unexplained weight loss: (--)    General health: good    Objective      Postural examination/scapula alignment:  mild fwd head and rounded shoulder, dec lumbar lordosis  Joint integrity: Firm end feeling /c lumbar P-A     Correction of posture: better with lumbar roll  Sitting: slouched, able to self correct   Standing: fair, dec lordosis   Palpation:  Hypertonic B lumbar paraspinals     MOVEMENT LOSS - Lumbar   Norms ROM Loss Initial   Flexion Fingers touch toes, sacral angle >/= 70 deg, uniform spinal curvature, posterior weight shift  minimal loss   Extension ASIS surpasses toes, spine of scapulae surpasses heels, uniform spinal curve moderate loss inc hip motion    Side glide Right  minimal loss   Side glide Left  minimal loss   Rotation Right PT observes contralateral shoulder minimal loss   Rotation Left PT observes contralateral shoulder minimal loss     Lower Extremity " Strength  Right LE  Left LE    Hip flexion: 5/5 Hip flexion: 5/5   Hip extension:  4/5 Hip extension: 4/5   Hip abduction: 4/5 Hip abduction: 4/5   Hip adduction:  5/5 Hip adduction:  5/5   Hip External Rotation 4+/5 Hip External Rotation 4+/5   Hip Internal rotation   4/5 Hip Internal rotation 4/5   Knee Flexion 5/5 Knee Flexion 5/5   Knee Extension 5/5 Knee Extension 5/5   Ankle dorsiflexion: 5/5 Ankle dorsiflexion: 5/5   Ankle plantarflexion: 5/5 Ankle plantarflexion: 5/5     GAIT:  Assistive Device used: none  Level of Assistance: independent  Patient displays the following gait deviations: no gait deviations observed.     Special Tests:   Test Name  Test Result   Prone Instability Test (--)   SI Joint Provocation Test (--)   Straight Leg Raise (--)   Neural Tension Test (--)   Crossed Straight Leg Raise (--)   Walking on toes Able   Walking on heels  Able     NEUROLOGICAL SCREENING:     Sensory deficits:     BLE LT intact  L5 myotme intact   Saddle Sensation intact     Reflexes:    Left Right   Patella Tendon Unable to elicit  Unable to elicit    Achilles Tendon Unable to elicit  Unable to elicit    Clonus (--) (--)     REPEATED TEST MOVEMENTS:    Baseline symptoms: 0/10 LBP  Repeated Flexion in Standing no effect   Repeated Extension in Standing no effect   Repeated Flexion in lying no effect   Repeated Extension in lying  end range pain  no effect, inc ROM       STATIC TESTS and other movements:0/10 LBP  Prone lie no effect   Prone lie on elbows no effect   Sitting slouched  no effect   Sitting erect better     Lumbar testing Visit 2     ADMIN FOTO AND INTAKE PAPERWORK VISIT 2    OUTCOMES SELECTION:   Intake Outcome Measure for FOTO LUMBAR Survey    Therapist reviewed FOTO scores for Hector Torres on 3/18/2024.   FOTO documents entered into EPIC - see Media section.    Intake Score: TBD% functional ability  Goal Score: TBD% functional ability          Treatment     Total Treatment time separate from  Evaluation: 25 minutes    Hector received therapeutic exercises to develop/improve posture, lumbar ROM, strength, and muscular endurance for 10 minutes including the following exercises:     HEP demonstrate include:   LTR x 10   Open Books x 5  Bridges x 5 cue for glute squeeze  SILVESTRE x 5   DKTC x 5 PRN for sx self management only     Written Home Exercises Provided: yes.    HEP AS FOLLOWS:  LTR  Open Books   Bridges  SILVESTRE   DKTC Sx self management only     Exercises were reviewed and Hector was able to demonstrate them prior to the end of the session. Hector demonstrated fair  understanding of the education provided.     See EMR under Patient Instructions for exercises provided 3/18/2024.    MedX Testing:  MedX testing to be performed next visit      Therapeutic Education/Activity provided for 15 minutes:   - Patient was given an [facility] Visit 1 handout which discusses the following:  - what to expect in therapy  - an overview of the program, including health coaching and wellness  - importance of spinal hygiene, proper posture, lifting mechanics, sleep quality, and nutrition/hydration   - Lillian roll trialed, recommended, and purchase information was provided.  - Patient received a handout regarding anticipated muscular soreness following the isometric test and strategies for management were reviewed with patient including stretching, using ice and scheduled rest.   - Patient received verbal education on the following:   - Healthy Back program   - purpose of the isometric test  - safe progression of lumbar strengthening, wellness approach, and systemic strengthening.   - safe usage of MedX machine and testing protocols.    .    Assessment     Hector is a 58 y.o. male referred to [facility] with a medical diagnosis of M54.16 (ICD-10-CM) - Lumbar radiculopathy, M48.062 (ICD-10-CM) - Lumbar stenosis with neurogenic claudication. Pt presents with subjective report consistent /c dx, however,  unable to elicit sx presentation /c provocative exercises today indicating good initial activity tolerance leading to likely inc exercise intensity in earlier appointments including quadruped and lifting tasks.  Pt presents with reported LBP and RLE sx  that is reproduced extended standing and reduced with sitting indicating sx self management directional preference of flexion.   However, upon repeated motions testing patient demonstrate limited yet pain free lumbar ext and likely not provoking stenotic sx in region noted in imaging due to ROM limitation.  Considering patient note sig seated time /c work and travel, patient educated on importance of posture and maintaining present mobility, therefore, HEP /c lumbar mobility and extension bias.  Upon physical assessment, pt demonstrates slouched posturing in sitting, mild hip weakness bilaterally, and trunk and hip mobility deficits.  Relative glute weakness likely leading to overactive lumbar paraspinals, therefore, recommend hip mobility and strengthening in POC to complement improved motor control to dec over-reliance on lumbar paraspinals.  Next visit planning lumbar MedX testing for baseline measures.  Also need to admin FOTO and fill out intake paperwork as patient late check in lead to dec tx time.  All of the above noted supports potential lumbar classification as a pattern 1 with recurrent/ or consistent symptoms, thus pt is a good candidate for the Healthy Back Program. Pt would benefit from LE and trunk mobility training, stability training,  improved cardiovascular and muscular endurance, neuromuscular re-education for posture, coordination, and muscular recruitment and education on positional offloading techniques to decrease the intensity and frequency of flare-ups.  Patient pro-active in joining PT and will benefit from POC to promote improved self efficacy for intent to return to the gym.         Pain Pattern: Pattern 1 inconclusive        Pt  prognosis is Good.     Pt will benefit from skilled outpatient Physical Therapy to address the deficits stated above and in the chart below, to provide pt/family education, and to maximize pt's level of independence. Based on the above history and physical examination an active physical therapy program is recommended.      Plan of care discussed with patient: Yes  Pt's spiritual, cultural and educational needs considered and patient is agreeable to the plan of care and goals as stated below:     Anticipated Barriers for therapy: none    PT Evaluation Completed? Yes    Medical necessity is demonstrated by the following problem list:    History  Co-morbidities and personal factors that may impact the plan of care [x] LOW: no personal factors / co-morbidities  [] MODERATE: 1-2 personal factors / co-morbidities  [] HIGH: 3+ personal factors / co-morbidities    Moderate / High Support Documentation:   Co-morbidities affecting plan of care:     Personal Factors:   no deficits     Examination  Body Structures and Functions, activity limitations and participation restrictions that may impact the plan of care [x] LOW: addressing 1-2 elements  [] MODERATE: 3+ elements  [] HIGH: 4+ elements (please support below)    Moderate / High Support Documentation: spinal ROM     Clinical Presentation [x] LOW: stable  [] MODERATE: Evolving  [] HIGH: Unstable     Decision Making/ Complexity Score: low         GOALS: Pt is in agreement with the following goals.    Short term goals:  6 weeks or 10 visits   - Pt will demonstrate increased lumbar MedX ROM by at least TBD degrees from the initial ROM value with improvements noted in functional ROM and ability to perform ADLs. Appropriate and Ongoing  - Pt will demonstrate increased MedX average isometric strength value by TBD% from initial test resulting in improved ability to perform bending, lifting, and carrying activities safely, confidently. Appropriate and Ongoing  - Pt will report a  reduction in worst pain score by 1-2 points for improved tolerance for household chores. Appropriate and Ongoing  - Pt able to perform HEP correctly with minimal cueing or supervision from therapist to encourage independent management of symptoms. Appropriate and Ongoing    Long term goals: 10 weeks or 20 visits   - Pt will demonstrate increased lumbar MedX ROM by at least TBD degrees from initial ROM value, resulting in improved ability to perform functional forward bending while standing and sitting. Appropriate and Ongoing  - Pt will demonstrate increased MedX average isometric strength value by TBD% from initial test resulting in improved ability to perform bending, lifting, and carrying activities safely and confidently. Appropriate and Ongoing  - Pt to demonstrate ability to independently control and reduce their pain through posture positioning and mechanical movements throughout a typical day. Appropriate and Ongoing  - Pt will demonstrate reduced pain and improved functional outcomes as reported on the FOTO by reaching an intake score of >/= TBD% functional ability in order to demonstrate subjective improvement in patient's condition. . Appropriate and Ongoing  - Pt will demonstrate independence with the HEP at discharge. Appropriate and Ongoing  - Patient report ability to stand as long as like at family soccer matches to demonstrate inc tolerance for recreational activities (patient goal) Appropriate and Ongoing  - Patient will return to regular gym workouts /s inc LBP or RLE sx to demonstrate inc self efficacy, dec fear avoidance and inc tolerance to recreational activities  Appropriate and Ongoing    Plan     Outpatient physical therapy 2x week for 10 weeks or 20 visits to include the following:   - Patient education  - Therapeutic exercise  - Manual therapy  - Performance testing   - Neuromuscular Re-education  - Therapeutic activity   - Modalities    Pt may be seen by PTA as part of the rehabilitation  team.     Therapist: Babak Herrera, PT  3/18/2024

## 2024-03-19 NOTE — PLAN OF CARE
[facility]  Physical Therapy Lumbar Evaluation      Name: Hector Torres  Clinic Number: [id number]    Therapy Diagnosis:   Encounter Diagnoses   Name Primary?    Lumbar radiculopathy     Lumbar stenosis with neurogenic claudication     Decreased range of motion of trunk and back Yes    Postural imbalance          Physician: Tito Tavarez MD    Physician Orders: PT Eval and Treat  Medical Diagnosis from Referral:   M54.16 (ICD-10-CM) - Lumbar radiculopathy   M48.062 (ICD-10-CM) - Lumbar stenosis with neurogenic claudication     Evaluation Date: 3/18/2024  Authorization Period Expiration: 12/31/24  Plan of Care Expiration: 5/27/2024  Reassessment Due: 4/18/2024  Visit # / Visits authorized: 01/01  ADMIN FOTO AND INTAKE PAPERWORK VISIT 2  MedX testing visit 2    Time In: 12:45 pm   Time Out: 1:30 pm   Total Billable Time: 45 minutes  INSURANCE and OUTCOMES: Fee for Service with FOTO Outcomes 1/3    Precautions: standard    Pattern of pain determined: Pattern 1 undetermined    Subjective     Date of onset: 3 months ago /p workout  History of current condition: Hector reports in past B LBP /c progression down RLE.  Patient note sig improvement in RLE sx since referral MD visit and clarifies at that time has sig sx improvement when meeting MD also. Patient report in the flare up patient note sig pain intensity and difficulty /c all motions. Patient note initially using crutches to walk due to pain in RLE.    At present, patient note intermittent numbness down R lat thigh especially /c extended standing (1 hr).   Presently, patient note LB stiffness in AM.  Patient feels he has dec spinal mobility.  Patient report minor discomfort /c don/doff sock/shoe in AM.      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 However, patient note no sig sx presentation as a result.   After flare up, patient patient report having stopped  recreational soccer and discontinuing his regular gym visits that included .  Patient clarify he stopped these out of concern for inc harm and potential inc discomfort.  As a result, patient feels sig less active since injury.    Patient note no prior PT for LB.   However, patient note 5 x wk gym visits and would like to return to this.     Per MD report   This is a 58-year-old male who had an acute exacerbation of lower back and right leg pain around 3 and half weeks ago after playing soccer. The pain was initially just in the lower back radiating down to the knees. Later on after a workout session which patient was doing dead lifts he felt pain started to go all the way down right leg stopping of the ankles. Pain got so severe that patient had used his crutches and was unable to bear weight on the right leg. He was traveling previsit had to get assistance with mobilization. He denies any left leg symptoms. Denies any bowel bladder issues. He does not have a history of back issue but states that over the last several months he does have some intermittent right thigh discomfort with prolonged standing that is relief when he sits down. Patient was treated with anti-inflammatories and then has helped his symptoms last week or so. Patient today's room 2/10 compared to prior which was 8 9/10. He is now able to walk normally he is able to bear weight. He has no longer using crutches. He was worked up with a MRI scan of the lumbar spine which shows lumbar stenosis.           Medical History:   Past Medical History:   Diagnosis Date    Heart murmur 9/28/2018 9/28/18 2/6    History of melanoma in situ 9/28/2018    Lower back, sees derm for annual skin exam    Latent tuberculosis by skin test 9/28/2018    Treated       Surgical History:   Hector Torres  has a past surgical history that includes Eye surgery (1967); Skin cancer excision; Colonoscopy (N/A, 11/02/2018); and Colonoscopy (N/A,  3/1/2024).    Medications:   Hector currently has no medications in their medication list.    Allergies:   Review of patient's allergies indicates:  No Known Allergies     Imaging:   EXAMINATION:  MRI LUMBAR SPINE WITHOUT CONTRAST     FINDINGS:  Alignment: Normal.  Vertebrae: Probable benign osseous hemangioma in L5 vertebral body.  No fracture.  No signal abnormality to suggest infiltrative process.  Small posterior projecting synovial cyst at the right L2 facet.  Discs: Mild disc height loss at L4-L5 and L5-S1.  Cord: Normal.  Conus terminates at L1     Degenerative findings:  T12-L1: Bilateral facet arthropathy.  No significant spinal canal stenosis or neural foraminal narrowing.  L1-L2: Bilateral facet arthropathy.  No significant spinal canal stenosis or neural foraminal narrowing.  L2-L3: Small diffuse posterior disc bulge, bilateral facet arthropathy and ligament flavum buckling.  Findings result in moderate spinal canal stenosis.  L3-L4: Diffuse posterior disc bulge, bilateral facet arthropathy and ligament flavum buckling.  Findings result in moderate/severe spinal canal stenosis.  L4-L5: Diffuse posterior disc bulge, bilateral facet arthropathy and ligament flavum buckling.  Findings result in severe spinal canal stenosis, severe right and mild left neural foraminal narrowing.  L5-S1: Small diffuse posterior disc bulge, bilateral facet arthropathy and ligament flavum buckling.  Findings result in mild left neural foraminal narrowing   Paraspinal muscles & soft tissues: Unremarkable.     Impression:  Lumbar spondylosis most notable at L4-L5 with severe spinal canal stenosis, severe right and mild left neural foraminal narrowing.    Prior Therapy: none   Prior Treatment: none   Social History:  lives in a 3 level home  lives with family (wife, 3 kids - older teens)  Occupation:    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  extended sitting?   Leisure:    soccer, gym visits, watching sons play soccer  Prior Level of  "Function: regular gym visits, weekend soccer league  Current Level of Function:  stopped playing soccer, stopped going to gym, dec standing tolerance   DME owned/used: No  Gym Membership: Yes, was going 4 x wk /c     Pain:  Current 0/10, worst 1/10 stiffness in AM, best 0/10   Location: bilateral LB R upper leg   Description: LB - stiffness R upper leg - numbness   Aggravating Factors: Standing and Morning  Easing Factors: sitting   Disturbed Sleep: No     Pattern of pain questions:  1.  Where is your pain the worst? RLE   2.  Is your pain constant or intermittent? Intermittent   3.  Does bending forward make your typical pain worse? In AM Yes  In PM feels relief    4.  Since the start of your back pain, has there been a change in your bowel or bladder? N  5.  What can't you do now that you use to be able to do? Recreational soccer and gym visits    Pts goals: "have an exercise program to prepare me for return to gym"     Red Flag Screening:   Cough/Sneeze Strain: (--)  Bladder/Bowel: (--)  Falls: (--)  Night pain: (--)  Unexplained weight loss: (--)    General health: good    Objective      Postural examination/scapula alignment:  mild fwd head and rounded shoulder, dec lumbar lordosis  Joint integrity: Firm end feeling /c lumbar P-A     Correction of posture: better with lumbar roll  Sitting: slouched, able to self correct   Standing: fair, dec lordosis   Palpation:  Hypertonic B lumbar paraspinals     MOVEMENT LOSS - Lumbar   Norms ROM Loss Initial   Flexion Fingers touch toes, sacral angle >/= 70 deg, uniform spinal curvature, posterior weight shift  minimal loss   Extension ASIS surpasses toes, spine of scapulae surpasses heels, uniform spinal curve moderate loss inc hip motion    Side glide Right  minimal loss   Side glide Left  minimal loss   Rotation Right PT observes contralateral shoulder minimal loss   Rotation Left PT observes contralateral shoulder minimal loss     Lower Extremity " Strength  Right LE  Left LE    Hip flexion: 5/5 Hip flexion: 5/5   Hip extension:  4/5 Hip extension: 4/5   Hip abduction: 4/5 Hip abduction: 4/5   Hip adduction:  5/5 Hip adduction:  5/5   Hip External Rotation 4+/5 Hip External Rotation 4+/5   Hip Internal rotation   4/5 Hip Internal rotation 4/5   Knee Flexion 5/5 Knee Flexion 5/5   Knee Extension 5/5 Knee Extension 5/5   Ankle dorsiflexion: 5/5 Ankle dorsiflexion: 5/5   Ankle plantarflexion: 5/5 Ankle plantarflexion: 5/5     GAIT:  Assistive Device used: none  Level of Assistance: independent  Patient displays the following gait deviations: no gait deviations observed.     Special Tests:   Test Name  Test Result   Prone Instability Test (--)   SI Joint Provocation Test (--)   Straight Leg Raise (--)   Neural Tension Test (--)   Crossed Straight Leg Raise (--)   Walking on toes Able   Walking on heels  Able     NEUROLOGICAL SCREENING:     Sensory deficits:     BLE LT intact  L5 myotme intact   Saddle Sensation intact     Reflexes:    Left Right   Patella Tendon Unable to elicit  Unable to elicit    Achilles Tendon Unable to elicit  Unable to elicit    Clonus (--) (--)     REPEATED TEST MOVEMENTS:    Baseline symptoms: 0/10 LBP  Repeated Flexion in Standing no effect   Repeated Extension in Standing no effect   Repeated Flexion in lying no effect   Repeated Extension in lying  end range pain  no effect, inc ROM       STATIC TESTS and other movements:0/10 LBP  Prone lie no effect   Prone lie on elbows no effect   Sitting slouched  no effect   Sitting erect better     Lumbar testing Visit 2     ADMIN FOTO AND INTAKE PAPERWORK VISIT 2    OUTCOMES SELECTION:   Intake Outcome Measure for FOTO LUMBAR Survey    Therapist reviewed FOTO scores for Hector Torres on 3/18/2024.   FOTO documents entered into EPIC - see Media section.    Intake Score: TBD% functional ability  Goal Score: TBD% functional ability          Treatment     Total Treatment time separate from  Evaluation: 25 minutes    Hector received therapeutic exercises to develop/improve posture, lumbar ROM, strength, and muscular endurance for 10 minutes including the following exercises:     HEP demonstrate include:   LTR x 10   Open Books x 5  Bridges x 5 cue for glute squeeze  SILVESTRE x 5   DKTC x 5 PRN for sx self management only     Written Home Exercises Provided: yes.    HEP AS FOLLOWS:  LTR  Open Books   Bridges  SILVESTRE   DKTC Sx self management only     Exercises were reviewed and Hector was able to demonstrate them prior to the end of the session. Hector demonstrated fair  understanding of the education provided.     See EMR under Patient Instructions for exercises provided 3/18/2024.    MedX Testing:  MedX testing to be performed next visit      Therapeutic Education/Activity provided for 15 minutes:   - Patient was given an [facility] 1 handout which discusses the following:  - what to expect in therapy  - an overview of the program, including health coaching and wellness  - importance of spinal hygiene, proper posture, lifting mechanics, sleep quality, and nutrition/hydration   - Lillian roll trialed, recommended, and purchase information was provided.  - Patient received a handout regarding anticipated muscular soreness following the isometric test and strategies for management were reviewed with patient including stretching, using ice and scheduled rest.   - Patient received verbal education on the following:   - Healthy [facility] program   - purpose of the isometric test  - safe progression of lumbar strengthening, wellness approach, and systemic strengthening.   - safe usage of MedX machine and testing protocols.    .    Assessment     Hector is a 58 y.o. male referred to [facility] with a medical diagnosis of M54.16 (ICD-10-CM) - Lumbar radiculopathy, M48.062 (ICD-10-CM) - Lumbar stenosis with neurogenic claudication. Pt presents with subjective report consistent /c dx, however,  unable to elicit sx presentation /c provocative exercises today indicating good initial activity tolerance leading to likely inc exercise intensity in earlier appointments including quadruped and lifting tasks.  Pt presents with reported LBP and RLE sx  that is reproduced extended standing and reduced with sitting indicating sx self management directional preference of flexion.   However, upon repeated motions testing patient demonstrate limited yet pain free lumbar ext and likely not provoking stenotic sx in region noted in imaging due to ROM limitation.  Considering patient note sig seated time /c work and travel, patient educated on importance of posture and maintaining present mobility, therefore, HEP /c lumbar mobility and extension bias.  Upon physical assessment, pt demonstrates slouched posturing in sitting, mild hip weakness bilaterally, and trunk and hip mobility deficits.  Relative glute weakness likely leading to overactive lumbar paraspinals, therefore, recommend hip mobility and strengthening in POC to complement improved motor control to dec over-reliance on lumbar paraspinals.  Next visit planning lumbar MedX testing for baseline measures.  Also need to admin FOTO and fill out intake paperwork as patient late check in lead to dec tx time.  All of the above noted supports potential lumbar classification as a pattern 1 with recurrent/ or consistent symptoms, thus pt is a good candidate for the Healthy [facility] Program. Pt would benefit from LE and trunk mobility training, stability training,  improved cardiovascular and muscular endurance, neuromuscular re-education for posture, coordination, and muscular recruitment and education on positional offloading techniques to decrease the intensity and frequency of flare-ups.  Patient pro-active in joining PT and will benefit from POC to promote improved self efficacy for intent to return to the gym.         Pain Pattern: Pattern 1 inconclusive        Pt  prognosis is Good.     Pt will benefit from skilled outpatient Physical Therapy to address the deficits stated above and in the chart below, to provide pt/family education, and to maximize pt's level of independence. Based on the above history and physical examination an active physical therapy program is recommended.      Plan of care discussed with patient: Yes  Pt's spiritual, cultural and educational needs considered and patient is agreeable to the plan of care and goals as stated below:     Anticipated Barriers for therapy: none    PT Evaluation Completed? Yes    Medical necessity is demonstrated by the following problem list:    History  Co-morbidities and personal factors that may impact the plan of care [x] LOW: no personal factors / co-morbidities  [] MODERATE: 1-2 personal factors / co-morbidities  [] HIGH: 3+ personal factors / co-morbidities    Moderate / High Support Documentation:   Co-morbidities affecting plan of care:     Personal Factors:   no deficits     Examination  Body Structures and Functions, activity limitations and participation restrictions that may impact the plan of care [x] LOW: addressing 1-2 elements  [] MODERATE: 3+ elements  [] HIGH: 4+ elements (please support below)    Moderate / High Support Documentation: spinal ROM     Clinical Presentation [x] LOW: stable  [] MODERATE: Evolving  [] HIGH: Unstable     Decision Making/ Complexity Score: low         GOALS: Pt is in agreement with the following goals.    Short term goals:  6 weeks or 10 visits   - Pt will demonstrate increased lumbar MedX ROM by at least TBD degrees from the initial ROM value with improvements noted in functional ROM and ability to perform ADLs. Appropriate and Ongoing  - Pt will demonstrate increased MedX average isometric strength value by TBD% from initial test resulting in improved ability to perform bending, lifting, and carrying activities safely, confidently. Appropriate and Ongoing  - Pt will report a  reduction in worst pain score by 1-2 points for improved tolerance for household chores. Appropriate and Ongoing  - Pt able to perform HEP correctly with minimal cueing or supervision from therapist to encourage independent management of symptoms. Appropriate and Ongoing    Long term goals: 10 weeks or 20 visits   - Pt will demonstrate increased lumbar MedX ROM by at least TBD degrees from initial ROM value, resulting in improved ability to perform functional forward bending while standing and sitting. Appropriate and Ongoing  - Pt will demonstrate increased MedX average isometric strength value by TBD% from initial test resulting in improved ability to perform bending, lifting, and carrying activities safely and confidently. Appropriate and Ongoing  - Pt to demonstrate ability to independently control and reduce their pain through posture positioning and mechanical movements throughout a typical day. Appropriate and Ongoing  - Pt will demonstrate reduced pain and improved functional outcomes as reported on the FOTO by reaching an intake score of >/= TBD% functional ability in order to demonstrate subjective improvement in patient's condition. . Appropriate and Ongoing  - Pt will demonstrate independence with the HEP at discharge. Appropriate and Ongoing  - Patient report ability to stand as long as like at family soccer matches to demonstrate inc tolerance for recreational activities (patient goal) Appropriate and Ongoing  - Patient will return to regular gym workouts /s inc LBP or RLE sx to demonstrate inc self efficacy, dec fear avoidance and inc tolerance to recreational activities  Appropriate and Ongoing    Plan     Outpatient physical therapy 2x week for 10 weeks or 20 visits to include the following:   - Patient education  - Therapeutic exercise  - Manual therapy  - Performance testing   - Neuromuscular Re-education  - Therapeutic activity   - Modalities    Pt may be seen by PTA as part of the rehabilitation  team.     Therapist: Babak Herrera, PT  3/18/2024

## 2024-04-02 ENCOUNTER — CLINICAL SUPPORT (OUTPATIENT)
Dept: REHABILITATION | Facility: OTHER | Age: 59
End: 2024-04-02
Payer: COMMERCIAL

## 2024-04-02 DIAGNOSIS — M25.69 DECREASED RANGE OF MOTION OF TRUNK AND BACK: Primary | ICD-10-CM

## 2024-04-02 DIAGNOSIS — R29.3 POSTURAL IMBALANCE: ICD-10-CM

## 2024-04-02 PROCEDURE — 97112 NEUROMUSCULAR REEDUCATION: CPT

## 2024-04-02 PROCEDURE — 97110 THERAPEUTIC EXERCISES: CPT | Mod: CQ

## 2024-04-02 NOTE — PROGRESS NOTES
[facility]  Physical Therapy Treatment Note     Name: Hector Torres  Clinic Number: [id number]    Therapy Diagnosis:   Encounter Diagnoses   Name Primary?    Decreased range of motion of trunk and back Yes    Postural imbalance      Physician: Tito Tavarez MD    Visit Date: 4/2/2024    Total Billable Time: 10 min       Subjective     See Jessica Saldivar PTA note for Subjective     Objective      Lumbar  Isometric Testing on Med X equipment: Testing administered by PT    Test Initial Baseline Midpoint Final   Date 04/02/24     ROM 0 - 54 deg     Max Peak Torque 286      Min Peak Torque 77      Flex/Ext Ratio 3.71     % below normative data 11     % below norm at 0 deg 45     % gain from initial test Not available visit 1       Treatment     Hector received the treatments listed below:      Medical MedX Treatment as follows:  MedX testing performed day 2: Patient  received neuromuscular education to engage spinal musculature correctly for motor control and engagement of musculature for 10 minutes including the MedX exercise component and practice and standard testing. MedX dynamic exercise and baseline isometric test performed with instructions to guide the patient safely through the testing procedure. Patient instructed to perform isometric test correctly and safely while building to an optimal force with a pain-free effort. Patient also instructed that they should feel support/pressure from MedX restraints but no pain/discomfort, and encouraged to report any pain to therapist. Patient demonstrated appropriate understanding of information and tolerance of test.  Education regarding purpose of test, safety during test given, and reviewed possible more soreness and strategies.           4/2/2024     4:14 PM   [facility]   Visit Number 2   VAS Pain Rating 0   Treadmill Time (in min.) 5 min   Time 5   Flexion in Lying 10   Lumbar Extension Seat Pad 0   Femur  Restraint 6   Top Dead Center 24   Counterweight 209   Lumbar Flexion 54   Lumbar Extension 0   Lumbar Peak Torque 286 ft. lbs.   Min Torque 77   Test Percent Below Normative Data 11 %   Lumbar Weight 60 lbs   Repetitions 5   Ice - Z Lie (in min.) 5       .    Hector participated in neuromuscular re-education activities to improve balance, coordination, proprioception, motor control and/or posture for  minutes. The following activities were included:      Hector participated in therapeutic exercises to develop strength, endurance, ROM, flexibility, posture, and core stabilization for  minutes including:    See Jessica Saldivar PTA note     Pt given cold pack for 5 minutes to lumbar region in Z lying.    Patient Education and Home Exercises     See Jessica Saldivar PTA note    Assessment     Hector presents to second healthy back visit visit.  Pt was able to tolerate Medical MedX machine well as follows:  MedX testing performed and patient tolerated test well.  PT, Babak Herrera, performed Lumbar MedX IM testing.  Pt falls 11% below norms and drops to 45% below norms at 0 degrees indicating appropriateness of postural awareness, corrective and strengthening in POC.          Patient is making good progress towards established goals.  Pt will continue to benefit from skilled outpatient physical therapy to address the deficits stated in the impairment chart, provide pt/family education and to maximize pt's level of independence in the home and community environment.       Pt's spiritual, cultural and educational needs considered and pt agreeable to plan of care and goals as stated below:     Goals:   See Jessica Saldivar PTA note     Plan     Continue with established Plan of Care towards established PT goals.     Therapist: Babak Herrera PT  4/2/2024

## 2024-04-02 NOTE — PROGRESS NOTES
[facility]  Physical Therapy Lumbar Evaluation      Name: Hector Torres  Clinic Number: [id number]    Therapy Diagnosis:   Encounter Diagnoses   Name Primary?    Decreased range of motion of trunk and back Yes    Postural imbalance          Physician: Tito Tavarez MD    Physician Orders: PT Eval and Treat  Medical Diagnosis from Referral:   M54.16 (ICD-10-CM) - Lumbar radiculopathy   M48.062 (ICD-10-CM) - Lumbar stenosis with neurogenic claudication     Evaluation Date: 4/2/2024  Authorization Period Expiration: 12/31/24  Plan of Care Expiration: 6/11/2024  Reassessment Due: 5/2/2024  Visit # / Visits authorized: 01/01  ADMIN FOTO AND INTAKE PAPERWORK VISIT 2  MedX testing visit 2    Time In: 12:45 pm   Time Out: 1:30 pm   Total Billable Time: 45 minutes  INSURANCE and OUTCOMES: Fee for Service with FOTO Outcomes 1/3    Precautions: standard    Pattern of pain determined: Pattern 1 undetermined    Subjective     Date of onset: 3 months ago /p workout  History of current condition: Hector reports in past B LBP /c progression down RLE.  Patient note sig improvement in RLE sx since referral MD visit and clarifies at that time has sig sx improvement when meeting MD also. Patient report in the flare up patient note sig pain intensity and difficulty /c all motions. Patient note initially using crutches to walk due to pain in RLE.    At present, patient note intermittent numbness down R lat thigh especially /c extended standing (1 hr).   Presently, patient note LB stiffness in AM.  Patient feels he has dec spinal mobility.  Patient report minor discomfort /c don/doff sock/shoe in AM.      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 However, patient note no sig sx presentation as a result.   After flare up, patient patient report having stopped recreational soccer and discontinuing his regular gym visits that included personal  .  Patient clarify he stopped these out of concern for inc harm and potential inc discomfort.  As a result, patient feels sig less active since injury.    Patient note no prior PT for LB.   However, patient note 5 x wk gym visits and would like to return to this.     Per MD report   This is a 58-year-old male who had an acute exacerbation of lower back and right leg pain around 3 and half weeks ago after playing soccer. The pain was initially just in the lower back radiating down to the knees. Later on after a workout session which patient was doing dead lifts he felt pain started to go all the way down right leg stopping of the ankles. Pain got so severe that patient had used his crutches and was unable to bear weight on the right leg. He was traveling previsit had to get assistance with mobilization. He denies any left leg symptoms. Denies any bowel bladder issues. He does not have a history of back issue but states that over the last several months he does have some intermittent right thigh discomfort with prolonged standing that is relief when he sits down. Patient was treated with anti-inflammatories and then has helped his symptoms last week or so. Patient today's room 2/10 compared to prior which was 8 9/10. He is now able to walk normally he is able to bear weight. He has no longer using crutches. He was worked up with a MRI scan of the lumbar spine which shows lumbar stenosis.           Medical History:   Past Medical History:   Diagnosis Date    Heart murmur 9/28/2018 9/28/18 2/6    History of melanoma in situ 9/28/2018    Lower back, sees derm for annual skin exam    Latent tuberculosis by skin test 9/28/2018    Treated       Surgical History:   Hector Torres  has a past surgical history that includes Eye surgery (1967); Skin cancer excision; Colonoscopy (N/A, 11/02/2018); and Colonoscopy (N/A, 3/1/2024).    Medications:   Hector currently has no medications in their medication  list.    Allergies:   Review of patient's allergies indicates:  No Known Allergies     Imaging:   EXAMINATION:  MRI LUMBAR SPINE WITHOUT CONTRAST     FINDINGS:  Alignment: Normal.  Vertebrae: Probable benign osseous hemangioma in L5 vertebral body.  No fracture.  No signal abnormality to suggest infiltrative process.  Small posterior projecting synovial cyst at the right L2 facet.  Discs: Mild disc height loss at L4-L5 and L5-S1.  Cord: Normal.  Conus terminates at L1     Degenerative findings:  T12-L1: Bilateral facet arthropathy.  No significant spinal canal stenosis or neural foraminal narrowing.  L1-L2: Bilateral facet arthropathy.  No significant spinal canal stenosis or neural foraminal narrowing.  L2-L3: Small diffuse posterior disc bulge, bilateral facet arthropathy and ligament flavum buckling.  Findings result in moderate spinal canal stenosis.  L3-L4: Diffuse posterior disc bulge, bilateral facet arthropathy and ligament flavum buckling.  Findings result in moderate/severe spinal canal stenosis.  L4-L5: Diffuse posterior disc bulge, bilateral facet arthropathy and ligament flavum buckling.  Findings result in severe spinal canal stenosis, severe right and mild left neural foraminal narrowing.  L5-S1: Small diffuse posterior disc bulge, bilateral facet arthropathy and ligament flavum buckling.  Findings result in mild left neural foraminal narrowing   Paraspinal muscles & soft tissues: Unremarkable.     Impression:  Lumbar spondylosis most notable at L4-L5 with severe spinal canal stenosis, severe right and mild left neural foraminal narrowing.    Prior Therapy: none   Prior Treatment: none   Social History:  lives in a 3 level home  lives with family (wife, 3 kids - older teens)  Occupation:    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  extended sitting?   Leisure:    soccer, gym visits, watching sons play soccer  Prior Level of Function: regular gym visits, weekend soccer league  Current Level of Function:  stopped  "playing soccer, stopped going to gym, dec standing tolerance   DME owned/used: No  Gym Membership: Yes, was going 4 x wk /c     Pain:  Current 0/10, worst 1/10 stiffness in AM, best 0/10   Location: bilateral LB R upper leg   Description: LB - stiffness R upper leg - numbness   Aggravating Factors: Standing and Morning  Easing Factors: sitting   Disturbed Sleep: No     Pattern of pain questions:  1.  Where is your pain the worst? RLE   2.  Is your pain constant or intermittent? Intermittent   3.  Does bending forward make your typical pain worse? In AM Yes  In PM feels relief    4.  Since the start of your back pain, has there been a change in your bowel or bladder? N  5.  What can't you do now that you use to be able to do? Recreational soccer and gym visits    Pts goals: "have an exercise program to prepare me for return to gym"     Red Flag Screening:   Cough/Sneeze Strain: (--)  Bladder/Bowel: (--)  Falls: (--)  Night pain: (--)  Unexplained weight loss: (--)    General health: good    Objective      Postural examination/scapula alignment:  mild fwd head and rounded shoulder, dec lumbar lordosis  Joint integrity: Firm end feeling /c lumbar P-A     Correction of posture: better with lumbar roll  Sitting: slouched, able to self correct   Standing: fair, dec lordosis   Palpation:  Hypertonic B lumbar paraspinals     MOVEMENT LOSS - Lumbar   Norms ROM Loss Initial   Flexion Fingers touch toes, sacral angle >/= 70 deg, uniform spinal curvature, posterior weight shift  minimal loss   Extension ASIS surpasses toes, spine of scapulae surpasses heels, uniform spinal curve moderate loss inc hip motion    Side glide Right  minimal loss   Side glide Left  minimal loss   Rotation Right PT observes contralateral shoulder minimal loss   Rotation Left PT observes contralateral shoulder minimal loss     Lower Extremity Strength  Right LE  Left LE    Hip flexion: 5/5 Hip flexion: 5/5   Hip extension:  4/5 Hip extension: " 4/5   Hip abduction: 4/5 Hip abduction: 4/5   Hip adduction:  5/5 Hip adduction:  5/5   Hip External Rotation 4+/5 Hip External Rotation 4+/5   Hip Internal rotation   4/5 Hip Internal rotation 4/5   Knee Flexion 5/5 Knee Flexion 5/5   Knee Extension 5/5 Knee Extension 5/5   Ankle dorsiflexion: 5/5 Ankle dorsiflexion: 5/5   Ankle plantarflexion: 5/5 Ankle plantarflexion: 5/5     GAIT:  Assistive Device used: none  Level of Assistance: independent  Patient displays the following gait deviations: no gait deviations observed.     Special Tests:   Test Name  Test Result   Prone Instability Test (--)   SI Joint Provocation Test (--)   Straight Leg Raise (--)   Neural Tension Test (--)   Crossed Straight Leg Raise (--)   Walking on toes Able   Walking on heels  Able     NEUROLOGICAL SCREENING:     Sensory deficits:     BLE LT intact  L5 myotme intact   Saddle Sensation intact     Reflexes:    Left Right   Patella Tendon Unable to elicit  Unable to elicit    Achilles Tendon Unable to elicit  Unable to elicit    Clonus (--) (--)     REPEATED TEST MOVEMENTS:    Baseline symptoms: 0/10 LBP  Repeated Flexion in Standing no effect   Repeated Extension in Standing no effect   Repeated Flexion in lying no effect   Repeated Extension in lying  end range pain  no effect, inc ROM       STATIC TESTS and other movements:0/10 LBP  Prone lie no effect   Prone lie on elbows no effect   Sitting slouched  no effect   Sitting erect better     Lumbar testing Visit 2     ADMIN FOTO AND INTAKE PAPERWORK VISIT 2    OUTCOMES SELECTION:   Intake Outcome Measure for FOTO LUMBAR Survey    Therapist reviewed FOTO scores for Hector Torres on 4/2/2024.   FOTO documents entered into [employer] - see Media section.    Intake Score: TBD% functional ability  Goal Score: TBD% functional ability          Treatment     Total Treatment time separate from Evaluation: 25 minutes    Hector received therapeutic exercises to develop/improve posture, lumbar ROM,  strength, and muscular endurance for 10 minutes including the following exercises:     HEP demonstrate include:   LTR x 10   Open Books x 5  Bridges x 5 cue for glute squeeze  SILVESTRE x 5   DKTC x 5 PRN for sx self management only     Written Home Exercises Provided: yes.    HEP AS FOLLOWS:  LTR  Open Books   Bridges  SLIVESTRE   DKTC Sx self management only     Exercises were reviewed and Hector was able to demonstrate them prior to the end of the session. Hector demonstrated fair  understanding of the education provided.     See EMR under Patient Instructions for exercises provided 3/18/2024.    MedX Testing:  MedX testing to be performed next visit      Therapeutic Education/Activity provided for 15 minutes:   - Patient was given an [facility] handout which discusses the following:  - what to expect in therapy  - an overview of the program, including health coaching and wellness  - importance of spinal hygiene, proper posture, lifting mechanics, sleep quality, and nutrition/hydration   - Lillian roll trialed, recommended, and purchase information was provided.  - Patient received a handout regarding anticipated muscular soreness following the isometric test and strategies for management were reviewed with patient including stretching, using ice and scheduled rest.   - Patient received verbal education on the following:   - Healthy Back program   - purpose of the isometric test  - safe progression of lumbar strengthening, wellness approach, and systemic strengthening.   - safe usage of MedX machine and testing protocols.    .    Assessment     Hector is a 58 y.o. male referred to [facility] with a medical diagnosis of M54.16 (ICD-10-CM) - Lumbar radiculopathy, M48.062 (ICD-10-CM) - Lumbar stenosis with neurogenic claudication. Pt presents with subjective report consistent /c dx, however, unable to elicit sx presentation /c provocative exercises today indicating good initial activity  tolerance leading to likely inc exercise intensity in earlier appointments including quadruped and lifting tasks.  Pt presents with reported LBP and RLE sx  that is reproduced extended standing and reduced with sitting indicating sx self management directional preference of flexion.   However, upon repeated motions testing patient demonstrate limited yet pain free lumbar ext and likely not provoking stenotic sx in region noted in imaging due to ROM limitation.  Considering patient note sig seated time /c work and travel, patient educated on importance of posture and maintaining present mobility, therefore, HEP /c lumbar mobility and extension bias.  Upon physical assessment, pt demonstrates slouched posturing in sitting, mild hip weakness bilaterally, and trunk and hip mobility deficits.  Relative glute weakness likely leading to overactive lumbar paraspinals, therefore, recommend hip mobility and strengthening in POC to complement improved motor control to dec over-reliance on lumbar paraspinals.  Next visit planning lumbar MedX testing for baseline measures.  Also need to admin FOTO and fill out intake paperwork as patient late check in lead to dec tx time.  All of the above noted supports potential lumbar classification as a pattern 1 with recurrent/ or consistent symptoms, thus pt is a good candidate for the Healthy Back Program. Pt would benefit from LE and trunk mobility training, stability training,  improved cardiovascular and muscular endurance, neuromuscular re-education for posture, coordination, and muscular recruitment and education on positional offloading techniques to decrease the intensity and frequency of flare-ups.  Patient pro-active in joining PT and will benefit from POC to promote improved self efficacy for intent to return to the gym.         Pain Pattern: Pattern 1 inconclusive        Pt prognosis is Good.     Pt will benefit from skilled outpatient Physical Therapy to address the deficits  stated above and in the chart below, to provide pt/family education, and to maximize pt's level of independence. Based on the above history and physical examination an active physical therapy program is recommended.      Plan of care discussed with patient: Yes  Pt's spiritual, cultural and educational needs considered and patient is agreeable to the plan of care and goals as stated below:     Anticipated Barriers for therapy: none    PT Evaluation Completed? Yes    Medical necessity is demonstrated by the following problem list:    History  Co-morbidities and personal factors that may impact the plan of care [x] LOW: no personal factors / co-morbidities  [] MODERATE: 1-2 personal factors / co-morbidities  [] HIGH: 3+ personal factors / co-morbidities    Moderate / High Support Documentation:   Co-morbidities affecting plan of care:     Personal Factors:   no deficits     Examination  Body Structures and Functions, activity limitations and participation restrictions that may impact the plan of care [x] LOW: addressing 1-2 elements  [] MODERATE: 3+ elements  [] HIGH: 4+ elements (please support below)    Moderate / High Support Documentation: spinal ROM     Clinical Presentation [x] LOW: stable  [] MODERATE: Evolving  [] HIGH: Unstable     Decision Making/ Complexity Score: low         GOALS: Pt is in agreement with the following goals.    Short term goals:  6 weeks or 10 visits   - Pt will demonstrate increased lumbar MedX ROM by at least TBD degrees from the initial ROM value with improvements noted in functional ROM and ability to perform ADLs. Appropriate and Ongoing  - Pt will demonstrate increased MedX average isometric strength value by TBD% from initial test resulting in improved ability to perform bending, lifting, and carrying activities safely, confidently. Appropriate and Ongoing  - Pt will report a reduction in worst pain score by 1-2 points for improved tolerance for household chores. Appropriate and  Ongoing  - Pt able to perform HEP correctly with minimal cueing or supervision from therapist to encourage independent management of symptoms. Appropriate and Ongoing    Long term goals: 10 weeks or 20 visits   - Pt will demonstrate increased lumbar MedX ROM by at least TBD degrees from initial ROM value, resulting in improved ability to perform functional forward bending while standing and sitting. Appropriate and Ongoing  - Pt will demonstrate increased MedX average isometric strength value by TBD% from initial test resulting in improved ability to perform bending, lifting, and carrying activities safely and confidently. Appropriate and Ongoing  - Pt to demonstrate ability to independently control and reduce their pain through posture positioning and mechanical movements throughout a typical day. Appropriate and Ongoing  - Pt will demonstrate reduced pain and improved functional outcomes as reported on the FOTO by reaching an intake score of >/= TBD% functional ability in order to demonstrate subjective improvement in patient's condition. . Appropriate and Ongoing  - Pt will demonstrate independence with the HEP at discharge. Appropriate and Ongoing  - Patient report ability to stand as long as like at family soccer matches to demonstrate inc tolerance for recreational activities (patient goal) Appropriate and Ongoing  - Patient will return to regular gym workouts /s inc LBP or RLE sx to demonstrate inc self efficacy, dec fear avoidance and inc tolerance to recreational activities  Appropriate and Ongoing    Plan     Outpatient physical therapy 2x week for 10 weeks or 20 visits to include the following:   - Patient education  - Therapeutic exercise  - Manual therapy  - Performance testing   - Neuromuscular Re-education  - Therapeutic activity   - Modalities    Pt may be seen by PTA as part of the rehabilitation team.     Therapist: Jessica Saldivar PTA  4/2/2024

## 2024-04-03 NOTE — PROGRESS NOTES
"  [facility]  Physical Therapy Lumbar      Name: Hector Torres  Clinic Number: [id number]    Therapy Diagnosis:   Encounter Diagnoses   Name Primary?    Decreased range of motion of trunk and back Yes    Postural imbalance      Physician: Tito Tavarez MD    Visit Date: 4/2/2024      Evaluation Date: 3/18/2024  Authorization Period Expiration: 12/31/24  Plan of Care Expiration: 5/27/2024  Reassessment Due: 4/18/2024  Visit # / Visits authorized: 01/01  ADMIN FOTO AND INTAKE PAPERWORK VISIT 2  MedX testing visit 2     Time In: 3:15 pm   Time Out: 4:00  pm   Total Billable Time: 45 minutes  INSURANCE and OUTCOMES: Fee for Service with FOTO Outcomes 1/3     Precautions: standard     Pattern of pain determined: Pattern 1 undetermined      Subjective     Pt reports: "feeling good." He denies having any pain at the moment despite lapse in Rx due to vacation.     He was not compliant with home exercise program.  Response to previous treatment: well  Functional change:     Pain: 0/10  Location: none    Objective   Lumbar  Isometric Testing on Med X equipment: Testing administered by PT     Test Initial Baseline Midpoint Final   Date 04/02/24       ROM 0 - 54 deg       Max Peak Torque 286        Min Peak Torque 77        Flex/Ext Ratio 3.71       % below normative data 11       % below norm at 0 deg 45       % gain from initial test Not available visit 1           Treatment      Hector received the treatments listed below:      "MedX testing performed and patient tolerated test well.  PT, Babak Herrera, performed Lumbar MedX IM testing.  Pt falls 11% below norms and drops to 45% below norms at 0 degrees indicating appropriateness of postural awareness, corrective and strengthening in POC."       Medical MedX Treatment as follows:  MedX testing performed day 2: Patient  received neuromuscular education to engage spinal musculature correctly for motor control and engagement of " "musculature for 10 minutes including the MedX exercise component and practice and standard testing. MedX dynamic exercise and baseline isometric test performed with instructions to guide the patient safely through the testing procedure. Patient instructed to perform isometric test correctly and safely while building to an optimal force with a pain-free effort. Patient also instructed that they should feel support/pressure from MedX restraints but no pain/discomfort, and encouraged to report any pain to therapist. Patient demonstrated appropriate understanding of information and tolerance of test.  Education regarding purpose of test, safety during test given, and reviewed possible more soreness and strategies.             4/2/2024     4:14 PM   [facility]   Visit Number 2   VAS Pain Rating 0   Treadmill Time (in min.) 5 min   Time 5   Flexion in Lying 10   Lumbar Extension Seat Pad 0   Femur Restraint 6   Top Dead Center 24   Counterweight 209   Lumbar Flexion 54   Lumbar Extension 0   Lumbar Peak Torque 286 ft. lbs.   Min Torque 77   Test Percent Below Normative Data 11 %   Lumbar Weight 60 lbs   Repetitions 5   Ice - Z Lie (in min.) 5                 Hector participated in neuromuscular re-education activities to improve balance, coordination, proprioception, motor control and/or posture for  minutes. The following activities were included:        Hector participated in therapeutic exercises to develop strength, endurance, ROM, flexibility, posture, and core stabilization for 40  minutes including:    Bike 5'  LTR x 10   Open Books x 5  Bridges x 5 cue for glute squeeze  SILVESTRE x 5"x10  DKTC x 5"x10    Peripheral muscle strengthening which included one set of 15-20 repetitions at a slow and controlled 10-13 second per rep pace focused on strengthening supporting musculature in order to improve body mechanics and functional mobility. Patient and therapist focused on proper form during treatment to ensure " optimal strengthening of each targeted muscle group.  Machines utilized included:Torso rotation, Leg Ext, Leg Curl, Chest Press, and Rowing,Triceps, Biceps, Hip Abd, and Leg Press         Hector received the following manual therapy techniques: Joint mobilizations, Manual traction, Myofacial release, Manual Lymphatic Drainage, Soft tissue Mobilization, and Friction Massage were applied to the:  for 0 minutes, including:           Pt given cold pack for 5 minutes to lumbar region in Z lying     Patient Education and Home Exercises         Home Exercises Provided and Patient Education Provided     Education provided:   -     Written Home Exercises Provided: Patient instructed to cont prior HEP.  Exercises were reviewed and Hector was able to demonstrate them prior to the end of the session.  Hector demonstrated good  understanding of the education provided.     See EMR under  report viewer  for exercises provided prior visit.    Assessment   Pt responded well to skilled therex to help reduce muscles tension and improve core strength for increase functional mobility.     Hector Is progressing well towards his goals.   Pt prognosis is Good.     Pt will continue to benefit from skilled outpatient physical therapy to address the deficits listed in the problem list box on initial evaluation, provide pt/family education and to maximize pt's level of independence in the home and community environment.     Pt's spiritual, cultural and educational needs considered and pt agreeable to plan of care and goals.     Anticipated barriers to physical therapy: none      GOALS: Pt is in agreement with the following goals.     Short term goals:  6 weeks or 10 visits   - Pt will demonstrate increased lumbar MedX ROM by at least TBD degrees from the initial ROM value with improvements noted in functional ROM and ability to perform ADLs. Appropriate and Ongoing  - Pt will demonstrate increased MedX average isometric strength value by  TBD% from initial test resulting in improved ability to perform bending, lifting, and carrying activities safely, confidently. Appropriate and Ongoing  - Pt will report a reduction in worst pain score by 1-2 points for improved tolerance for household chores. Appropriate and Ongoing  - Pt able to perform HEP correctly with minimal cueing or supervision from therapist to encourage independent management of symptoms. Appropriate and Ongoing     Long term goals: 10 weeks or 20 visits   - Pt will demonstrate increased lumbar MedX ROM by at least TBD degrees from initial ROM value, resulting in improved ability to perform functional forward bending while standing and sitting. Appropriate and Ongoing  - Pt will demonstrate increased MedX average isometric strength value by TBD% from initial test resulting in improved ability to perform bending, lifting, and carrying activities safely and confidently. Appropriate and Ongoing  - Pt to demonstrate ability to independently control and reduce their pain through posture positioning and mechanical movements throughout a typical day. Appropriate and Ongoing  - Pt will demonstrate reduced pain and improved functional outcomes as reported on the FOTO by reaching an intake score of >/= TBD% functional ability in order to demonstrate subjective improvement in patient's condition. . Appropriate and Ongoing  - Pt will demonstrate independence with the HEP at discharge. Appropriate and Ongoing  - Patient report ability to stand as long as like at family soccer matches to demonstrate inc tolerance for recreational activities (patient goal) Appropriate and Ongoing  - Patient will return to regular gym workouts /s inc LBP or RLE sx to demonstrate inc self efficacy, dec fear avoidance and inc tolerance to recreational activities  Appropriate and Ongoing      Plan     Continue with established Plan of Care towards established PT goals.    Jessica Saldivar, PTA   04/02/2024

## 2024-10-25 ENCOUNTER — OFFICE VISIT (OUTPATIENT)
Dept: PRIMARY CARE CLINIC | Facility: CLINIC | Age: 59
End: 2024-10-25
Payer: COMMERCIAL

## 2024-10-25 ENCOUNTER — LAB VISIT (OUTPATIENT)
Dept: LAB | Facility: HOSPITAL | Age: 59
End: 2024-10-25
Attending: INTERNAL MEDICINE
Payer: COMMERCIAL

## 2024-10-25 VITALS
BODY MASS INDEX: 28.09 KG/M2 | WEIGHT: 196.19 LBS | TEMPERATURE: 98 F | RESPIRATION RATE: 20 BRPM | DIASTOLIC BLOOD PRESSURE: 70 MMHG | OXYGEN SATURATION: 99 % | HEART RATE: 70 BPM | SYSTOLIC BLOOD PRESSURE: 128 MMHG | HEIGHT: 70 IN

## 2024-10-25 DIAGNOSIS — Z12.5 PROSTATE CANCER SCREENING: ICD-10-CM

## 2024-10-25 DIAGNOSIS — Z00.00 ROUTINE MEDICAL EXAM: Primary | ICD-10-CM

## 2024-10-25 DIAGNOSIS — Z00.00 ROUTINE MEDICAL EXAM: ICD-10-CM

## 2024-10-25 DIAGNOSIS — Z23 INFLUENZA VACCINE NEEDED: ICD-10-CM

## 2024-10-25 DIAGNOSIS — G47.33 OSA (OBSTRUCTIVE SLEEP APNEA): ICD-10-CM

## 2024-10-25 PROBLEM — R06.81 WITNESSED APNEIC SPELLS: Status: RESOLVED | Noted: 2024-02-09 | Resolved: 2024-10-25

## 2024-10-25 LAB
ALBUMIN SERPL BCP-MCNC: 4.5 G/DL (ref 3.5–5.2)
ALP SERPL-CCNC: 40 U/L (ref 40–150)
ALT SERPL W/O P-5'-P-CCNC: 25 U/L (ref 10–44)
ANION GAP SERPL CALC-SCNC: 11 MMOL/L (ref 8–16)
AST SERPL-CCNC: 28 U/L (ref 10–40)
BILIRUB SERPL-MCNC: 1.4 MG/DL (ref 0.1–1)
BUN SERPL-MCNC: 12 MG/DL (ref 6–20)
CALCIUM SERPL-MCNC: 9.1 MG/DL (ref 8.7–10.5)
CHLORIDE SERPL-SCNC: 104 MMOL/L (ref 95–110)
CHOLEST SERPL-MCNC: 182 MG/DL (ref 120–199)
CHOLEST/HDLC SERPL: 3.4 {RATIO} (ref 2–5)
CO2 SERPL-SCNC: 25 MMOL/L (ref 23–29)
COMPLEXED PSA SERPL-MCNC: 1.8 NG/ML (ref 0–4)
CREAT SERPL-MCNC: 1.2 MG/DL (ref 0.5–1.4)
ERYTHROCYTE [DISTWIDTH] IN BLOOD BY AUTOMATED COUNT: 13.2 % (ref 11.5–14.5)
EST. GFR  (NO RACE VARIABLE): >60 ML/MIN/1.73 M^2
GLUCOSE SERPL-MCNC: 97 MG/DL (ref 70–110)
HCT VFR BLD AUTO: 41.7 % (ref 40–54)
HDLC SERPL-MCNC: 53 MG/DL (ref 40–75)
HDLC SERPL: 29.1 % (ref 20–50)
HGB BLD-MCNC: 13.8 G/DL (ref 14–18)
LDLC SERPL CALC-MCNC: 118.4 MG/DL (ref 63–159)
MCH RBC QN AUTO: 30.5 PG (ref 27–31)
MCHC RBC AUTO-ENTMCNC: 33.1 G/DL (ref 32–36)
MCV RBC AUTO: 92 FL (ref 82–98)
NONHDLC SERPL-MCNC: 129 MG/DL
PLATELET # BLD AUTO: 263 K/UL (ref 150–450)
PMV BLD AUTO: 11.1 FL (ref 9.2–12.9)
POTASSIUM SERPL-SCNC: 4.4 MMOL/L (ref 3.5–5.1)
PROT SERPL-MCNC: 7.2 G/DL (ref 6–8.4)
RBC # BLD AUTO: 4.52 M/UL (ref 4.6–6.2)
SODIUM SERPL-SCNC: 140 MMOL/L (ref 136–145)
TRIGL SERPL-MCNC: 53 MG/DL (ref 30–150)
WBC # BLD AUTO: 6.52 K/UL (ref 3.9–12.7)

## 2024-10-25 PROCEDURE — 85027 COMPLETE CBC AUTOMATED: CPT | Performed by: INTERNAL MEDICINE

## 2024-10-25 PROCEDURE — 99999 PR PBB SHADOW E&M-EST. PATIENT-LVL IV: CPT | Mod: PBBFAC,,, | Performed by: INTERNAL MEDICINE

## 2024-10-25 PROCEDURE — 80061 LIPID PANEL: CPT | Performed by: INTERNAL MEDICINE

## 2024-10-25 PROCEDURE — 84153 ASSAY OF PSA TOTAL: CPT | Performed by: INTERNAL MEDICINE

## 2024-10-25 PROCEDURE — 36415 COLL VENOUS BLD VENIPUNCTURE: CPT | Mod: PN | Performed by: INTERNAL MEDICINE

## 2024-10-25 PROCEDURE — 80053 COMPREHEN METABOLIC PANEL: CPT | Performed by: INTERNAL MEDICINE

## 2024-10-25 NOTE — PROGRESS NOTES
Subjective     Patient ID: Hector Torres is a 59 y.o. male.    Chief Complaint: Annual Exam    Last seen by me 3 years ago. Returns for annual physical. Weight loss noted - intentional with diet and exercise.     PMH:   CATINA diagnosed 2/24, does not have CPAP as ordered.   Melanoma in situ, Derm in [place] annually.   Latent TB treated.   H/O Heart murmur.     PSH: Skin cancer excision, Eye surgery at age 2.     Colonoscopy 2015 - polyps removed, repeat 11/18 and 3/24 entirely normal. Eye exam - not recently. Sees a Dermatologist periodically for skin screening. Vaccines reviewed.     Social: Non-smoker, social alcohol. , 3 children. [employer]  of [employer] Regular diet, two cups coffee daily, exercising.     FMH: HTN both parents, CAD father at an elderly age, Arthritis in mother. Colon cancer in a grandparent. DM in a niece.     NKDA.    Medications: None.      Review of Systems   Constitutional:  Negative for activity change, appetite change, chills, fatigue, fever and unexpected weight change.   HENT:  Negative for nasal congestion, ear pain, hearing loss, postnasal drip, rhinorrhea, sore throat, trouble swallowing and voice change.    Eyes:  Negative for pain and visual disturbance.   Respiratory:  Positive for apnea. Negative for cough, chest tightness, shortness of breath and wheezing.    Cardiovascular:  Negative for chest pain, palpitations and leg swelling.   Gastrointestinal:  Negative for abdominal pain, blood in stool, constipation, diarrhea, nausea and vomiting.   Genitourinary:  Negative for difficulty urinating, dysuria, frequency, hematuria, scrotal swelling and urgency.   Musculoskeletal:  Negative for arthralgias, back pain, gait problem, myalgias, neck pain and neck stiffness.   Integumentary:  Negative for color change and rash.   Neurological:  Negative for dizziness, seizures, syncope, weakness, numbness and headaches.   Hematological:  Negative for adenopathy. Does  "not bruise/bleed easily.   Psychiatric/Behavioral:  Negative for agitation, decreased concentration, dysphoric mood and sleep disturbance. The patient is not nervous/anxious.           Objective   Vitals:    10/25/24 0734   BP: 128/70   BP Location: Right arm   Patient Position: Sitting   Pulse: 70   Resp: 20   Temp: 98.2 °F (36.8 °C)   TempSrc: Oral   SpO2: 99%   Weight: 89 kg (196 lb 3.4 oz)    From 224 nine months ago.    Height: 5' 10" (1.778 m)   BMI=28  Physical Exam  Constitutional:       General: He is not in acute distress.     Appearance: Normal appearance. He is well-developed. He is not diaphoretic.   HENT:      Head: Normocephalic and atraumatic.      Right Ear: Tympanic membrane, ear canal and external ear normal.      Left Ear: Tympanic membrane, ear canal and external ear normal.      Nose: Nose normal. No congestion.      Mouth/Throat:      Mouth: Mucous membranes are moist.      Pharynx: Oropharynx is clear.   Eyes:      General: No scleral icterus.     Extraocular Movements: Extraocular movements intact.      Conjunctiva/sclera: Conjunctivae normal.      Pupils: Pupils are equal, round, and reactive to light.   Neck:      Thyroid: No thyromegaly.      Vascular: No carotid bruit or JVD.   Cardiovascular:      Rate and Rhythm: Normal rate and regular rhythm.      Pulses: Normal pulses.      Heart sounds: Normal heart sounds. No murmur heard.     No friction rub. No gallop.   Pulmonary:      Effort: Pulmonary effort is normal. No respiratory distress.      Breath sounds: Normal breath sounds. No wheezing, rhonchi or rales.   Abdominal:      General: Bowel sounds are normal. There is no distension.      Palpations: Abdomen is soft. There is no mass.      Tenderness: There is no abdominal tenderness.      Hernia: No hernia is present.   Genitourinary:     Penis: Normal.       Testes: Normal.      Prostate: Normal.      Rectum: Normal.      Comments: Skin lesions in groin area include a skin tag on " right lateral penis and multiple tiny dark flat nevi on scrotum; he will follow up with Dermatology.  Musculoskeletal:         General: No tenderness or deformity. Normal range of motion.      Cervical back: Normal range of motion and neck supple.      Right lower leg: No edema.      Left lower leg: No edema.   Lymphadenopathy:      Cervical: No cervical adenopathy.   Skin:     General: Skin is warm and dry.      Findings: No erythema or rash.   Neurological:      General: No focal deficit present.      Mental Status: He is alert and oriented to person, place, and time.      Cranial Nerves: No cranial nerve deficit.      Motor: No weakness or abnormal muscle tone.      Coordination: Coordination normal.      Gait: Gait normal.   Psychiatric:         Mood and Affect: Mood and affect normal.         Speech: Speech normal.         Behavior: Behavior normal.         Thought Content: Thought content normal.         Judgment: Judgment normal.          Assessment and Plan     1. Routine medical exam  -     CBC Without Differential; Future; Expected date: 10/25/2024  -     Comprehensive Metabolic Panel; Future; Expected date: 10/25/2024  -     Lipid Panel; Future; Expected date: 10/25/2024    2. CATINA (obstructive sleep apnea)        -     giving phone # to [facility] to get his CPAP.      3. Prostate cancer screening  -     PSA, Screening; Future; Expected date: 10/25/2024    4. Influenza vaccine needed - Flu shot today.     COVID booster declined.        Follow up in about 1 year (around 10/25/2025).

## 2024-11-03 ENCOUNTER — PATIENT MESSAGE (OUTPATIENT)
Dept: PRIMARY CARE CLINIC | Facility: CLINIC | Age: 59
End: 2024-11-03
Payer: COMMERCIAL

## 2024-11-05 ENCOUNTER — PATIENT MESSAGE (OUTPATIENT)
Dept: RESEARCH | Facility: HOSPITAL | Age: 59
End: 2024-11-05
Payer: COMMERCIAL